# Patient Record
Sex: MALE | Race: WHITE | NOT HISPANIC OR LATINO
[De-identification: names, ages, dates, MRNs, and addresses within clinical notes are randomized per-mention and may not be internally consistent; named-entity substitution may affect disease eponyms.]

---

## 2018-07-10 PROBLEM — Z00.00 ENCOUNTER FOR PREVENTIVE HEALTH EXAMINATION: Status: ACTIVE | Noted: 2018-07-10

## 2018-08-15 ENCOUNTER — APPOINTMENT (OUTPATIENT)
Dept: UROLOGY | Facility: CLINIC | Age: 68
End: 2018-08-15

## 2019-01-10 ENCOUNTER — APPOINTMENT (OUTPATIENT)
Dept: SURGERY | Facility: CLINIC | Age: 69
End: 2019-01-10
Payer: MEDICARE

## 2019-01-10 VITALS — HEIGHT: 72 IN | BODY MASS INDEX: 30.75 KG/M2 | WEIGHT: 227 LBS

## 2019-01-10 PROCEDURE — 99203 OFFICE O/P NEW LOW 30 MIN: CPT

## 2019-01-10 NOTE — CONSULT LETTER
[Dear  ___] : Dear  [unfilled], [Courtesy Letter:] : I had the pleasure of seeing your patient, [unfilled], in my office today. [Please see my note below.] : Please see my note below. [Consult Closing:] : Thank you very much for allowing me to participate in the care of this patient.  If you have any questions, please do not hesitate to contact me. [FreeTextEntry3] : Respectfully,\par \par Abhijeet Nowak M.D., FACS\par

## 2019-01-10 NOTE — ASSESSMENT
[FreeTextEntry1] : Gregory is a pleasant 68-year-old retired gentleman with a past medical history significant for hypertension and hypercholesterolemia along with a past surgical history significant for a left inguinal hernia repair in 1975 in [place] who presents to the office today with an enlarging and now tender bulge in the right groin another hernia.\par \par Physical examination demonstrates a large lemon-sized bulge in the right groin extending into his right upper hemiscrotum which is moderately tender and only partially reducible consistent with an incarcerated right inguinal hernia warranting surgical repair. There is no evidence of incarceration or strangulation, and the patient denies any symptoms of obstruction.  Examination of his left groin demonstrates a well-healed scar with no evidence of hernia recurrence. His umbilical examination is unremarkable. He does have a significant amount of excess abdominal weight and his current BMI is 31.\par \par I explained the pros and cons of surgery, as well as all risks, benefits, indications and alternatives of the procedure and the patient agrees. Gregory was scheduled for the repair of his incarcerated right inguinal hernia with mesh on Wednesday, February 13, 2019 under local with IV sedation at the [facility] with presurgical testing waived.  The patient was encouraged to avoid heavy lifting and strenuous activity in the interim, of course. Of note, Gregory has a cruise scheduled for the end of this month and he was encouraged to avoid carrying any heavy suitcases on that trip.

## 2019-01-10 NOTE — PHYSICAL EXAM
[Normal Breath Sounds] : Normal breath sounds [No Rash or Lesion] : No rash or lesion [Alert] : alert [Calm] : calm [JVD] : no jugular venous distention  [de-identified] : overweight [de-identified] : normal [de-identified] : protuberant abdomen\par  [de-identified] : normal testicles [de-identified] : large incarcerated right inguinal hernia

## 2019-02-13 ENCOUNTER — OUTPATIENT (OUTPATIENT)
Dept: OUTPATIENT SERVICES | Facility: HOSPITAL | Age: 69
LOS: 1 days | Discharge: HOME | End: 2019-02-13

## 2019-02-13 ENCOUNTER — APPOINTMENT (OUTPATIENT)
Dept: SURGERY | Facility: AMBULATORY SURGERY CENTER | Age: 69
End: 2019-02-13
Payer: MEDICARE

## 2019-02-13 VITALS
DIASTOLIC BLOOD PRESSURE: 76 MMHG | TEMPERATURE: 97 F | HEIGHT: 78 IN | OXYGEN SATURATION: 97 % | RESPIRATION RATE: 18 BRPM | HEART RATE: 68 BPM | SYSTOLIC BLOOD PRESSURE: 132 MMHG | WEIGHT: 227.08 LBS

## 2019-02-13 VITALS
OXYGEN SATURATION: 99 % | SYSTOLIC BLOOD PRESSURE: 130 MMHG | DIASTOLIC BLOOD PRESSURE: 71 MMHG | RESPIRATION RATE: 18 BRPM | HEART RATE: 62 BPM

## 2019-02-13 DIAGNOSIS — Z98.890 OTHER SPECIFIED POSTPROCEDURAL STATES: Chronic | ICD-10-CM

## 2019-02-13 PROCEDURE — 49507 PRP I/HERN INIT BLOCK >5 YR: CPT | Mod: RT

## 2019-02-13 RX ORDER — MORPHINE SULFATE 50 MG/1
2 CAPSULE, EXTENDED RELEASE ORAL
Qty: 0 | Refills: 0 | Status: DISCONTINUED | OUTPATIENT
Start: 2019-02-13 | End: 2019-02-13

## 2019-02-13 RX ORDER — ONDANSETRON 8 MG/1
4 TABLET, FILM COATED ORAL ONCE
Qty: 0 | Refills: 0 | Status: DISCONTINUED | OUTPATIENT
Start: 2019-02-13 | End: 2019-02-28

## 2019-02-13 RX ORDER — OXYCODONE AND ACETAMINOPHEN 5; 325 MG/1; MG/1
1 TABLET ORAL ONCE
Qty: 0 | Refills: 0 | Status: DISCONTINUED | OUTPATIENT
Start: 2019-02-13 | End: 2019-02-13

## 2019-02-13 RX ORDER — TRAMADOL HYDROCHLORIDE 50 MG/1
1 TABLET ORAL
Qty: 30 | Refills: 0
Start: 2019-02-13 | End: 2019-02-17

## 2019-02-13 RX ORDER — SODIUM CHLORIDE 9 MG/ML
1000 INJECTION, SOLUTION INTRAVENOUS
Qty: 0 | Refills: 0 | Status: DISCONTINUED | OUTPATIENT
Start: 2019-02-13 | End: 2019-02-28

## 2019-02-13 RX ORDER — HYDROMORPHONE HYDROCHLORIDE 2 MG/ML
0.5 INJECTION INTRAMUSCULAR; INTRAVENOUS; SUBCUTANEOUS
Qty: 0 | Refills: 0 | Status: DISCONTINUED | OUTPATIENT
Start: 2019-02-13 | End: 2019-02-13

## 2019-02-13 RX ADMIN — SODIUM CHLORIDE 100 MILLILITER(S): 9 INJECTION, SOLUTION INTRAVENOUS at 16:14

## 2019-02-13 NOTE — BRIEF OPERATIVE NOTE - PROCEDURE
<<-----Click on this checkbox to enter Procedure Open repair of incarcerated inguinal hernia using mesh in adult  02/13/2019    Active  Abhijeet Nowak

## 2019-02-13 NOTE — CHART NOTE - NSCHARTNOTEFT_GEN_A_CORE
PACU ANESTHESIA ADMISSION NOTE      Procedure: Open repair of incarcerated inguinal hernia using mesh in adult    Post op diagnosis:  Incarcerated right inguinal hernia      ____  Intubated  TV:______       Rate: ______      FiO2: ______    _x___  Patent Airway    _x___  Full return of protective reflexes    _x___  Full recovery from anesthesia / back to baseline status    Vitals:    See anesthesia record      Mental Status:  _x___ Awake   _____ Alert   _____ Drowsy   _____ Sedated    Nausea/Vomiting:  _x___  NO       ______Yes,   See Post - Op Orders         Pain Scale (0-10):  __0___    Treatment: _x___ None    ____ See Post - Op/PCA Orders    Post - Operative Fluids:   __x__ Oral   ____ See Post - Op Orders    Plan: Discharge:   _x___Home       _____Floor     _____Critical Care    _____  Other:_________________    Comments:  No anesthesia issues or complications noted.  Discharge when criteria met.

## 2019-02-17 DIAGNOSIS — K40.30 UNILATERAL INGUINAL HERNIA, WITH OBSTRUCTION, WITHOUT GANGRENE, NOT SPECIFIED AS RECURRENT: ICD-10-CM

## 2019-02-17 DIAGNOSIS — I10 ESSENTIAL (PRIMARY) HYPERTENSION: ICD-10-CM

## 2019-02-26 ENCOUNTER — APPOINTMENT (OUTPATIENT)
Dept: SURGERY | Facility: CLINIC | Age: 69
End: 2019-02-26
Payer: MEDICARE

## 2019-02-26 DIAGNOSIS — E66.09 OTHER OBESITY DUE TO EXCESS CALORIES: ICD-10-CM

## 2019-02-26 DIAGNOSIS — K40.90 UNILATERAL INGUINAL HERNIA, W/OUT OBSTRUCTION OR GANGRENE, NOT SPECIFIED AS RECURRENT: ICD-10-CM

## 2019-02-26 PROCEDURE — 99024 POSTOP FOLLOW-UP VISIT: CPT

## 2019-02-26 NOTE — CONSULT LETTER
[FreeTextEntry1] : Dear Dr. Daria Alfonso, \par \par I had the pleasure of seeing your patient, RENETTA BARDALES, in my office today. Please see my note below. \par \par Thank you very much for allowing me to participate in the care of this patient. If you have any questions, please do not hesitate to contact me. \par \par \par Respectfully,\par \par Abhijeet Nowak M.D., FACS

## 2019-02-26 NOTE — ASSESSMENT
[FreeTextEntry1] : Gregory underwent the repair of his very large incarcerated indirect right inguinal hernia with mesh on February 13, 2019 under local with IV sedation without any problems or complications. His wound is clean, dry and intact. There is no evidence of erythema, seroma formation or infection. He is tolerating a diet and having normal bowel movements. He denies any significant postoperative pain or discomfort at this time.\par \par He was counseled and reassured. He was discharged from the office with no specific followup necessary, but he knows to avoid any heavy lifting or strenuous activity for the next several weeks. We also discussed the importance of calorie restriction and healthy eating with regard to weight loss, hernia recurrence and one's overall health.

## 2021-01-16 NOTE — ASU DISCHARGE PLAN (ADULT/PEDIATRIC). - NOTIFY
Called patient to schedule consult with Dr. Marie, there was no answer.  Left message with my direct line [phone].       Bleeding that does not stop

## 2022-04-06 ENCOUNTER — EMERGENCY (EMERGENCY)
Facility: HOSPITAL | Age: 72
LOS: 0 days | Discharge: HOME | End: 2022-04-06
Attending: STUDENT IN AN ORGANIZED HEALTH CARE EDUCATION/TRAINING PROGRAM | Admitting: STUDENT IN AN ORGANIZED HEALTH CARE EDUCATION/TRAINING PROGRAM
Payer: MEDICARE

## 2022-04-06 VITALS
HEART RATE: 89 BPM | DIASTOLIC BLOOD PRESSURE: 90 MMHG | RESPIRATION RATE: 18 BRPM | OXYGEN SATURATION: 99 % | SYSTOLIC BLOOD PRESSURE: 165 MMHG

## 2022-04-06 VITALS
TEMPERATURE: 97 F | SYSTOLIC BLOOD PRESSURE: 178 MMHG | OXYGEN SATURATION: 99 % | WEIGHT: 229.94 LBS | HEIGHT: 78 IN | DIASTOLIC BLOOD PRESSURE: 101 MMHG | HEART RATE: 87 BPM | RESPIRATION RATE: 17 BRPM

## 2022-04-06 DIAGNOSIS — I10 ESSENTIAL (PRIMARY) HYPERTENSION: ICD-10-CM

## 2022-04-06 DIAGNOSIS — Z98.890 OTHER SPECIFIED POSTPROCEDURAL STATES: Chronic | ICD-10-CM

## 2022-04-06 DIAGNOSIS — R07.9 CHEST PAIN, UNSPECIFIED: ICD-10-CM

## 2022-04-06 DIAGNOSIS — Z86.16 PERSONAL HISTORY OF COVID-19: ICD-10-CM

## 2022-04-06 DIAGNOSIS — U07.1 COVID-19: ICD-10-CM

## 2022-04-06 DIAGNOSIS — E78.5 HYPERLIPIDEMIA, UNSPECIFIED: ICD-10-CM

## 2022-04-06 DIAGNOSIS — E78.00 PURE HYPERCHOLESTEROLEMIA, UNSPECIFIED: ICD-10-CM

## 2022-04-06 DIAGNOSIS — R07.89 OTHER CHEST PAIN: ICD-10-CM

## 2022-04-06 DIAGNOSIS — R91.1 SOLITARY PULMONARY NODULE: ICD-10-CM

## 2022-04-06 DIAGNOSIS — R00.1 BRADYCARDIA, UNSPECIFIED: ICD-10-CM

## 2022-04-06 LAB
ALBUMIN SERPL ELPH-MCNC: 4.7 G/DL — SIGNIFICANT CHANGE UP (ref 3.5–5.2)
ALP SERPL-CCNC: 55 U/L — SIGNIFICANT CHANGE UP (ref 30–115)
ALT FLD-CCNC: 33 U/L — SIGNIFICANT CHANGE UP (ref 0–41)
ANION GAP SERPL CALC-SCNC: 13 MMOL/L — SIGNIFICANT CHANGE UP (ref 7–14)
AST SERPL-CCNC: 31 U/L — SIGNIFICANT CHANGE UP (ref 0–41)
BASOPHILS # BLD AUTO: 0.01 K/UL — SIGNIFICANT CHANGE UP (ref 0–0.2)
BASOPHILS NFR BLD AUTO: 0.3 % — SIGNIFICANT CHANGE UP (ref 0–1)
BILIRUB SERPL-MCNC: 0.8 MG/DL — SIGNIFICANT CHANGE UP (ref 0.2–1.2)
BUN SERPL-MCNC: 20 MG/DL — SIGNIFICANT CHANGE UP (ref 10–20)
CALCIUM SERPL-MCNC: 9.6 MG/DL — SIGNIFICANT CHANGE UP (ref 8.5–10.1)
CHLORIDE SERPL-SCNC: 104 MMOL/L — SIGNIFICANT CHANGE UP (ref 98–110)
CO2 SERPL-SCNC: 22 MMOL/L — SIGNIFICANT CHANGE UP (ref 17–32)
CREAT SERPL-MCNC: 1.2 MG/DL — SIGNIFICANT CHANGE UP (ref 0.7–1.5)
EGFR: 65 ML/MIN/1.73M2 — SIGNIFICANT CHANGE UP
EOSINOPHIL # BLD AUTO: 0.04 K/UL — SIGNIFICANT CHANGE UP (ref 0–0.7)
EOSINOPHIL NFR BLD AUTO: 1.1 % — SIGNIFICANT CHANGE UP (ref 0–8)
GLUCOSE SERPL-MCNC: 105 MG/DL — HIGH (ref 70–99)
HCT VFR BLD CALC: 41 % — LOW (ref 42–52)
HGB BLD-MCNC: 14 G/DL — SIGNIFICANT CHANGE UP (ref 14–18)
IMM GRANULOCYTES NFR BLD AUTO: 0.6 % — HIGH (ref 0.1–0.3)
LYMPHOCYTES # BLD AUTO: 0.73 K/UL — LOW (ref 1.2–3.4)
LYMPHOCYTES # BLD AUTO: 21 % — SIGNIFICANT CHANGE UP (ref 20.5–51.1)
MAGNESIUM SERPL-MCNC: 2 MG/DL — SIGNIFICANT CHANGE UP (ref 1.8–2.4)
MCHC RBC-ENTMCNC: 30.2 PG — SIGNIFICANT CHANGE UP (ref 27–31)
MCHC RBC-ENTMCNC: 34.1 G/DL — SIGNIFICANT CHANGE UP (ref 32–37)
MCV RBC AUTO: 88.4 FL — SIGNIFICANT CHANGE UP (ref 80–94)
MONOCYTES # BLD AUTO: 0.35 K/UL — SIGNIFICANT CHANGE UP (ref 0.1–0.6)
MONOCYTES NFR BLD AUTO: 10.1 % — HIGH (ref 1.7–9.3)
NEUTROPHILS # BLD AUTO: 2.33 K/UL — SIGNIFICANT CHANGE UP (ref 1.4–6.5)
NEUTROPHILS NFR BLD AUTO: 66.9 % — SIGNIFICANT CHANGE UP (ref 42.2–75.2)
NRBC # BLD: 0 /100 WBCS — SIGNIFICANT CHANGE UP (ref 0–0)
NT-PROBNP SERPL-SCNC: 74 PG/ML — SIGNIFICANT CHANGE UP (ref 0–300)
PLATELET # BLD AUTO: 149 K/UL — SIGNIFICANT CHANGE UP (ref 130–400)
POTASSIUM SERPL-MCNC: 4.4 MMOL/L — SIGNIFICANT CHANGE UP (ref 3.5–5)
POTASSIUM SERPL-SCNC: 4.4 MMOL/L — SIGNIFICANT CHANGE UP (ref 3.5–5)
PROT SERPL-MCNC: 6.7 G/DL — SIGNIFICANT CHANGE UP (ref 6–8)
RBC # BLD: 4.64 M/UL — LOW (ref 4.7–6.1)
RBC # FLD: 12.6 % — SIGNIFICANT CHANGE UP (ref 11.5–14.5)
SODIUM SERPL-SCNC: 139 MMOL/L — SIGNIFICANT CHANGE UP (ref 135–146)
TROPONIN T SERPL-MCNC: <0.01 NG/ML — SIGNIFICANT CHANGE UP
TROPONIN T SERPL-MCNC: <0.01 NG/ML — SIGNIFICANT CHANGE UP
WBC # BLD: 3.48 K/UL — LOW (ref 4.8–10.8)
WBC # FLD AUTO: 3.48 K/UL — LOW (ref 4.8–10.8)

## 2022-04-06 PROCEDURE — 99285 EMERGENCY DEPT VISIT HI MDM: CPT

## 2022-04-06 PROCEDURE — 93010 ELECTROCARDIOGRAM REPORT: CPT | Mod: 76

## 2022-04-06 PROCEDURE — 71045 X-RAY EXAM CHEST 1 VIEW: CPT | Mod: 26

## 2022-04-06 RX ORDER — ATORVASTATIN CALCIUM 80 MG/1
1 TABLET, FILM COATED ORAL
Qty: 0 | Refills: 0 | DISCHARGE

## 2022-04-06 RX ORDER — OLMESARTAN MEDOXOMIL 5 MG/1
1 TABLET, FILM COATED ORAL
Qty: 0 | Refills: 0 | DISCHARGE

## 2022-04-06 NOTE — ED PROVIDER NOTE - CLINICAL SUMMARY MEDICAL DECISION MAKING FREE TEXT BOX
71-year-old male past medical history HTN, HLD who presents to the ER for chest pain.  atypical, + RF. EKG, CXR, labs, trop x2 reviewed. On reassessment.  Patient is well-appearing.  Patient was made aware of incidental findings and told to follow-up with their primary care physician. I have fully discussed the medical management and delivery of care with the patient. I have discussed any available labs, imaging and treatment options with the patient. All Questions answered at the bedside and printed copies of all results provided and recommended to review with PCP. Patient confirms understanding and has been given detailed return precautions. Patient instructed to return to the ED should symptoms persist or worsen. Patient has demonstrated capacity and has verbalized understanding. Patient is well appearing upon discharge, ambulatory with a steady gait.

## 2022-04-06 NOTE — ED PROVIDER NOTE - OBJECTIVE STATEMENT
72 yo male, pmh of htn, hld, presents to ed for cp, midsternal, mild, aching, no radiation, tob use, fh of cardiac history. Denies fever, chills, sob, abd pain, nvd. Pt states tested + covid 3 days ago.

## 2022-04-06 NOTE — ED PROVIDER NOTE - CARE PLAN
1 Principal Discharge DX:	Chest pain   Principal Discharge DX:	Chest pain  Secondary Diagnosis:	Lesion of lung

## 2022-04-06 NOTE — ED PROVIDER NOTE - ATTENDING CONTRIBUTION TO CARE
71-year-old male past medical history HTN, HLD who presents to the ER for chest pain.  He describes the pain as tightness, substernal, nonradiating, no aggravating or alleviating factors.  Of note, he tested positive for Covid on .  Denies shortness of breath, lower extremity edema, recent surgery or travel, history of blood clots.  Denies smoking, last stress test was approximately 8 years ago.  He reports that his father  from an MI at the age of 53.    Vitals noted, triage note reviewed    Gen - NAD, Head - NCAT, Pharynx - clear, MMM, Heart - RRR, no m/g/r, Lungs - CTAB, no w/c/r, Abdomen - soft, NT, ND, Skin - No rash, Extremities - FROM, no edema, erythema, ecchymosis, brisk cap refill, Neuro - A&O x3, equal strength and sensation, non-focal exam    a/p:  CP, atypical, + RF  EKG, CXR, labs, trop x2  reassess

## 2022-04-06 NOTE — ED PROVIDER NOTE - PHYSICAL EXAMINATION
Physical Exam    Vital Signs: I have reviewed the initial vital signs.  Constitutional: well-nourished, appears stated age, no acute distress  Eyes: Conjunctiva pink, Sclera clear  Cardiovascular: S1 and S2, regular rate, regular rhythm, well-perfused extremities, radial pulses equal and 2+, pedal pulses 2+ and equal   Respiratory: unlabored respiratory effort, clear to auscultation bilaterally no wheezing, rales and rhonchi  Gastrointestinal: soft, non-tender abdomen, no pulsatile mass, normal bowl sounds  Musculoskeletal: supple neck, no lower extremity edema, no midline tenderness  Integumentary: warm, dry, no rash  Neurologic: awake, alert,  nvi

## 2022-04-06 NOTE — ED PROVIDER NOTE - PROGRESS NOTE DETAILS
JR: Patient reassessed at the bedside.  Discussed results thus far.  EKG and troponin negative x1.  Will recommend 2nd trop @17:30 and cardiology referral

## 2022-04-06 NOTE — ED PROVIDER NOTE - CARE PROVIDER_API CALL
Lucian Menjivar)  Cardiovascular Disease; Internal Medicine  [address] [place] [address]  Phone: [phone]  Fax: [phone]  Follow Up Time: 1-3 Days

## 2022-04-06 NOTE — ED PROVIDER NOTE - NS ED ATTENDING STATEMENT MOD
Does NOT APPEAR VISUALLY SIGNIFICANT. This was a shared visit with the LIDIA. I reviewed and verified the documentation and independently performed the documented:

## 2022-04-06 NOTE — ED ADULT NURSE NOTE - NSICDXPASTMEDICALHX_GEN_ALL_CORE_FT
PAST MEDICAL HISTORY:  High cholesterol     Hypertension      PAST MEDICAL HISTORY:  COVID-19     High cholesterol     Hypertension

## 2022-04-06 NOTE — ED PROVIDER NOTE - PATIENT PORTAL LINK FT
You can access the FollowMyHealth Patient Portal offered by [facility] by registering at the following website: [email or web address]. By joining [employer]’s FollowMyHealth portal, you will also be able to view your health information using other applications (apps) compatible with our system.

## 2022-09-28 NOTE — ASU PATIENT PROFILE, ADULT - PRO INTERPRETER NEED 2
This was a shared visit with the MAURISIO. I reviewed and verified the documentation and independently performed the documented: English

## 2023-05-05 NOTE — ASU PREOP CHECKLIST - TAMPON REMOVED
Forms/Letter Request    Type of form/letter: Received 2 page fax from [facility]  Discharge from Agency  Order #[id number]  Put in PCP in basket    Have you been seen for this request:     Do we have the form/letter: Yes    Who is the form from?     Where did/will the form come from? form was faxed in    When is form/letter needed by:     How would you like the form/letter returned: Please fax to: [phone]       UBALDO Ovalles   
Faxed to: [phone]  Katlyn Barker, TC   
Signed, returned to [facility].    
n/a

## 2024-11-14 ENCOUNTER — INPATIENT (INPATIENT)
Facility: HOSPITAL | Age: 74
LOS: 0 days | Discharge: HOME | DRG: 299 | End: 2024-11-15
Attending: INTERNAL MEDICINE | Admitting: FAMILY MEDICINE
Payer: MEDICARE

## 2024-11-14 VITALS
HEART RATE: 73 BPM | DIASTOLIC BLOOD PRESSURE: 94 MMHG | WEIGHT: 225.09 LBS | HEIGHT: 72 IN | TEMPERATURE: 98 F | SYSTOLIC BLOOD PRESSURE: 149 MMHG | RESPIRATION RATE: 18 BRPM | OXYGEN SATURATION: 99 %

## 2024-11-14 DIAGNOSIS — Z86.16 PERSONAL HISTORY OF COVID-19: ICD-10-CM

## 2024-11-14 DIAGNOSIS — I26.99 OTHER PULMONARY EMBOLISM WITHOUT ACUTE COR PULMONALE: ICD-10-CM

## 2024-11-14 DIAGNOSIS — Z98.890 OTHER SPECIFIED POSTPROCEDURAL STATES: Chronic | ICD-10-CM

## 2024-11-14 DIAGNOSIS — R59.0 LOCALIZED ENLARGED LYMPH NODES: ICD-10-CM

## 2024-11-14 DIAGNOSIS — E78.00 PURE HYPERCHOLESTEROLEMIA, UNSPECIFIED: ICD-10-CM

## 2024-11-14 PROBLEM — U07.1 COVID-19: Chronic | Status: ACTIVE | Noted: 2022-04-06

## 2024-11-14 LAB
ALBUMIN SERPL ELPH-MCNC: 5 G/DL — SIGNIFICANT CHANGE UP (ref 3.5–5.2)
ALP SERPL-CCNC: 59 U/L — SIGNIFICANT CHANGE UP (ref 30–115)
ALT FLD-CCNC: 26 U/L — SIGNIFICANT CHANGE UP (ref 0–41)
ANION GAP SERPL CALC-SCNC: 13 MMOL/L — SIGNIFICANT CHANGE UP (ref 7–14)
APTT BLD: 29.6 SEC — SIGNIFICANT CHANGE UP (ref 27–39.2)
AST SERPL-CCNC: 23 U/L — SIGNIFICANT CHANGE UP (ref 0–41)
BASOPHILS # BLD AUTO: 0.02 K/UL — SIGNIFICANT CHANGE UP (ref 0–0.2)
BASOPHILS NFR BLD AUTO: 0.4 % — SIGNIFICANT CHANGE UP (ref 0–1)
BILIRUB SERPL-MCNC: 1.3 MG/DL — HIGH (ref 0.2–1.2)
BUN SERPL-MCNC: 15 MG/DL — SIGNIFICANT CHANGE UP (ref 10–20)
CALCIUM SERPL-MCNC: 10.1 MG/DL — SIGNIFICANT CHANGE UP (ref 8.4–10.5)
CHLORIDE SERPL-SCNC: 101 MMOL/L — SIGNIFICANT CHANGE UP (ref 98–110)
CO2 SERPL-SCNC: 24 MMOL/L — SIGNIFICANT CHANGE UP (ref 17–32)
CREAT SERPL-MCNC: 1.4 MG/DL — SIGNIFICANT CHANGE UP (ref 0.7–1.5)
EGFR: 53 ML/MIN/1.73M2 — LOW
EOSINOPHIL # BLD AUTO: 0.08 K/UL — SIGNIFICANT CHANGE UP (ref 0–0.7)
EOSINOPHIL NFR BLD AUTO: 1.5 % — SIGNIFICANT CHANGE UP (ref 0–8)
GLUCOSE SERPL-MCNC: 98 MG/DL — SIGNIFICANT CHANGE UP (ref 70–99)
HCT VFR BLD CALC: 43.1 % — SIGNIFICANT CHANGE UP (ref 42–52)
HGB BLD-MCNC: 15 G/DL — SIGNIFICANT CHANGE UP (ref 14–18)
IMM GRANULOCYTES NFR BLD AUTO: 0.4 % — HIGH (ref 0.1–0.3)
INR BLD: 0.95 RATIO — SIGNIFICANT CHANGE UP (ref 0.65–1.3)
LYMPHOCYTES # BLD AUTO: 1.24 K/UL — SIGNIFICANT CHANGE UP (ref 1.2–3.4)
LYMPHOCYTES # BLD AUTO: 23.1 % — SIGNIFICANT CHANGE UP (ref 20.5–51.1)
MCHC RBC-ENTMCNC: 30.8 PG — SIGNIFICANT CHANGE UP (ref 27–31)
MCHC RBC-ENTMCNC: 34.8 G/DL — SIGNIFICANT CHANGE UP (ref 32–37)
MCV RBC AUTO: 88.5 FL — SIGNIFICANT CHANGE UP (ref 80–94)
MONOCYTES # BLD AUTO: 0.44 K/UL — SIGNIFICANT CHANGE UP (ref 0.1–0.6)
MONOCYTES NFR BLD AUTO: 8.2 % — SIGNIFICANT CHANGE UP (ref 1.7–9.3)
NEUTROPHILS # BLD AUTO: 3.56 K/UL — SIGNIFICANT CHANGE UP (ref 1.4–6.5)
NEUTROPHILS NFR BLD AUTO: 66.4 % — SIGNIFICANT CHANGE UP (ref 42.2–75.2)
NRBC # BLD: 0 /100 WBCS — SIGNIFICANT CHANGE UP (ref 0–0)
NT-PROBNP SERPL-SCNC: 93 PG/ML — SIGNIFICANT CHANGE UP (ref 0–300)
PLATELET # BLD AUTO: 179 K/UL — SIGNIFICANT CHANGE UP (ref 130–400)
PMV BLD: 9.6 FL — SIGNIFICANT CHANGE UP (ref 7.4–10.4)
POTASSIUM SERPL-MCNC: 4.1 MMOL/L — SIGNIFICANT CHANGE UP (ref 3.5–5)
POTASSIUM SERPL-SCNC: 4.1 MMOL/L — SIGNIFICANT CHANGE UP (ref 3.5–5)
PROT SERPL-MCNC: 7.3 G/DL — SIGNIFICANT CHANGE UP (ref 6–8)
PROTHROM AB SERPL-ACNC: 11.2 SEC — SIGNIFICANT CHANGE UP (ref 9.95–12.87)
RBC # BLD: 4.87 M/UL — SIGNIFICANT CHANGE UP (ref 4.7–6.1)
RBC # FLD: 12.6 % — SIGNIFICANT CHANGE UP (ref 11.5–14.5)
SODIUM SERPL-SCNC: 138 MMOL/L — SIGNIFICANT CHANGE UP (ref 135–146)
TROPONIN T, HIGH SENSITIVITY RESULT: 12 NG/L — SIGNIFICANT CHANGE UP (ref 6–21)
WBC # BLD: 5.36 K/UL — SIGNIFICANT CHANGE UP (ref 4.8–10.8)
WBC # FLD AUTO: 5.36 K/UL — SIGNIFICANT CHANGE UP (ref 4.8–10.8)

## 2024-11-14 PROCEDURE — 99285 EMERGENCY DEPT VISIT HI MDM: CPT

## 2024-11-14 PROCEDURE — 71275 CT ANGIOGRAPHY CHEST: CPT | Mod: 26,MC

## 2024-11-14 PROCEDURE — 93970 EXTREMITY STUDY: CPT | Mod: 26

## 2024-11-14 PROCEDURE — 85025 COMPLETE CBC W/AUTO DIFF WBC: CPT

## 2024-11-14 PROCEDURE — 80053 COMPREHEN METABOLIC PANEL: CPT

## 2024-11-14 PROCEDURE — 99223 1ST HOSP IP/OBS HIGH 75: CPT

## 2024-11-14 PROCEDURE — 80061 LIPID PANEL: CPT

## 2024-11-14 RX ORDER — AMLODIPINE BESYLATE 10 MG
5 TABLET ORAL DAILY
Refills: 0 | Status: DISCONTINUED | OUTPATIENT
Start: 2024-11-14 | End: 2024-11-15

## 2024-11-14 RX ORDER — MELATONIN 5 MG
3 TABLET ORAL AT BEDTIME
Refills: 0 | Status: DISCONTINUED | OUTPATIENT
Start: 2024-11-14 | End: 2024-11-15

## 2024-11-14 RX ORDER — TAMSULOSIN HCL 0.4 MG
1 CAPSULE ORAL
Refills: 0 | DISCHARGE

## 2024-11-14 RX ORDER — MAGNESIUM, ALUMINUM HYDROXIDE 200-200 MG
30 TABLET,CHEWABLE ORAL EVERY 4 HOURS
Refills: 0 | Status: DISCONTINUED | OUTPATIENT
Start: 2024-11-14 | End: 2024-11-15

## 2024-11-14 RX ORDER — ONDANSETRON HYDROCHLORIDE 2 MG/ML
4 INJECTION, SOLUTION INTRAMUSCULAR; INTRAVENOUS EVERY 8 HOURS
Refills: 0 | Status: DISCONTINUED | OUTPATIENT
Start: 2024-11-14 | End: 2024-11-15

## 2024-11-14 RX ORDER — AMLODIPINE BESYLATE 10 MG
1 TABLET ORAL
Refills: 0 | DISCHARGE

## 2024-11-14 RX ORDER — TAMSULOSIN HCL 0.4 MG
0.4 CAPSULE ORAL AT BEDTIME
Refills: 0 | Status: DISCONTINUED | OUTPATIENT
Start: 2024-11-14 | End: 2024-11-15

## 2024-11-14 RX ORDER — APIXABAN 5 MG/1
10 TABLET, FILM COATED ORAL ONCE
Refills: 0 | Status: COMPLETED | OUTPATIENT
Start: 2024-11-14 | End: 2024-11-14

## 2024-11-14 RX ORDER — ENOXAPARIN SODIUM 40MG/0.4ML
100 SYRINGE (ML) SUBCUTANEOUS EVERY 12 HOURS
Refills: 0 | Status: DISCONTINUED | OUTPATIENT
Start: 2024-11-15 | End: 2024-11-15

## 2024-11-14 RX ORDER — ACETAMINOPHEN 500 MG
650 TABLET ORAL EVERY 6 HOURS
Refills: 0 | Status: DISCONTINUED | OUTPATIENT
Start: 2024-11-14 | End: 2024-11-15

## 2024-11-14 RX ADMIN — Medication 0.4 MILLIGRAM(S): at 22:30

## 2024-11-14 RX ADMIN — APIXABAN 10 MILLIGRAM(S): 5 TABLET, FILM COATED ORAL at 20:32

## 2024-11-14 RX ADMIN — Medication 20 MILLIGRAM(S): at 22:31

## 2024-11-14 NOTE — H&P ADULT - HISTORY OF PRESENT ILLNESS
75 y/o Male  c/o left leg swelling for past 2 weeks, seen by PMD today , sent to ED for sono to R/o DVT,  no h/o leg trauma, no h/o DVT,   admits to some LYN past 2 weeks 75 y/o Male  PmHx HTN, HLD, BPH, vascular surg of Left Lower Ext presents with c/o left leg swelling for past 2 weeks.  Pt states he was seen at PMD today, sent to ED from office for further work-up.  Pt denies leg trauma,   Presently, NAD, accompanied by spouse.      Home meds  provided by pt  however spouse will bring med bottles in am.   75 y/o Male  PmHx HTN, HLD, BPH, vascular surg of Left Lower Ext presents with c/o left leg swelling for past 2 weeks.  Pt states he was seen at PMD today, sent to ED from office for further work-up.  Pt denies leg trauma,   Presently, NAD, accompanied by spouse.      reports some sob at time.  denies any CP, palpitation, abdm pain, N/V/D, headache, dizziness, blurry vision, numbness or weakness    Home meds  provided by pt  however spouse will bring med bottles in am.

## 2024-11-14 NOTE — H&P ADULT - ASSESSMENT
73 y/o Male  PmHx HTN, HLD, BPH, vascular surg of Left Lower Ext presents with c/o left leg swelling for past 2 weeks.  Pt states he was seen at PMD today, sent to ED from office for further work-up.  Pt denies leg trauma,   Presently, NAD, accompanied by spouse.        # Pulmonary Embolism  - Lovenox 100mg sq Q12  - Pulm consult    # Lung Nodule  - Pulm consult  - Hem/Onc consult    # HTN  - VS  - c/w home med    # HLD  - c/w home med    # BPH  - c/w home med     73 y/o Male  PmHx HTN, HLD, BPH, vascular surg of Left Lower Ext presents with c/o left leg swelling for past 2 weeks.  Pt states he was seen at PMD today, sent to ED from office for further work-up.  Pt denies leg trauma,   Presently, NAD, accompanied by spouse.        # Pulmonary Embolism  #DVT pending US reading  #Lung nodule  -Chest CTA shows  Bilateral pulmonary emboli as described. No CT evidence of right  heart strain.  Irregular 1.5 cm right middle lobe pulmonary nodular opacity.  Adjacent surrounding satellite nodularity. This is of indeterminate   etiology with neoplasm on the differential. Recommend further oncologic workup and consider PET/CT evaluation.  Additional numerous scattered pulmonary nodules, indeterminate.   Indeterminate right seventh rib sclerotic lesion, cannot exclude neoplastic etiology/metastasis..  Calcified mediastinal lymphadenopathy.  - Lovenox 100mg sq Q12  -hematology/oncology, and  Pulm consult  -Patient is aware of the lung nodule  -Hx of smoking, quit 30 yrs ago.  negative colonoscopy last year           # HTN  - VS  - c/w home med    # HLD  - c/w home med    # BPH  - c/w home med    #Progress Note Handoff  Pending (specify):  as above   Family discussion:  plan of care was discussed with patient and family in details.  all questions were answered.  seems to understand, and in agreement  Disposition: home

## 2024-11-14 NOTE — ED PROVIDER NOTE - NSICDXPASTMEDICALHX_GEN_ALL_CORE_FT
Vaccine Information Statement(s) for was given today. This has been reviewed, questions answered, and verbal consent given by Patient for injection(s) and administration of Hepatitis A.    Clinic supplied vaccine.    Patient tolerated without incident. See immunization grid for documentation.    Bilateral ear irrigation done. Pt tolerated well. Large amount of cerumen flushed from left ear.    PAST MEDICAL HISTORY:  COVID-19     High cholesterol     Hypertension

## 2024-11-14 NOTE — ED PROVIDER NOTE - CLINICAL SUMMARY MEDICAL DECISION MAKING FREE TEXT BOX
74-year-old male past medical history of hypertension, dyslipidemia, BPH, prior vein removal to left lower extremity who presents to the emergency department for increasing left leg pain and swelling over the past 2 weeks.  Patient sent by primary care doctor for evaluation.  Additional history obtained from patient's wife.  Patient had no falls or direct trauma but reports having pain and swelling to his left calf.  Patient reports intermittently having chest pain and does report having shortness of breath at times.  No fever or signs of infectious etiology.    On exam, vital signs reviewed.  Patient is nontoxic-appearing.  Patient has normal heart sounds and clear lungs bilaterally.  Patient has normal neuro exam.  Patient has pitting edema and swelling with calf tenderness on the left with left calf significantly larger than the right.  DVT study performed and shows extensive DVT to left leg.  IV placed and labs sent and patient had CTA of chest to evaluate for PE given shortness of breath and intermittent chest pain.  Patient has normal kidney function and normal white blood cell count and CTA of chest shows bilateral pulmonary emboli with no right heart strain.  Patient has normal troponin and BNP level.  Patient started on Eliquis at PE dosage and plan made for admission for formal echo and further workup and management for large DVT to leg and bilateral PEs.  Patient's PESI score calculated and dispo decision.  Patient and wife spoken to in detail about results and plan of care, need for admission, need for further workup and testing.  Patient has no contraindications to anticoagulation.

## 2024-11-14 NOTE — ED ADULT NURSE NOTE - NSFALLUNIVINTERV_ED_ALL_ED
Bed/Stretcher in lowest position, wheels locked, appropriate side rails in place/Call bell, personal items and telephone in reach/Instruct patient to call for assistance before getting out of bed/chair/stretcher/Non-slip footwear applied when patient is off stretcher/[place] to call system/Physically safe environment - no spills, clutter or unnecessary equipment/Purposeful proactive rounding/Room/bathroom lighting operational, light cord in reach

## 2024-11-14 NOTE — ED PROVIDER NOTE - OBJECTIVE STATEMENT
Patient c/o left leg swelling for past 2 weeks, seen by PMD today , sent to ED for sono to R/o DVT,  no h/o leg trauma, no h/o DVT,   admits to some LYN past 2 weeks

## 2024-11-14 NOTE — H&P ADULT - NSHPPHYSICALEXAM_GEN_ALL_CORE
Vital Signs Last 24 Hrs  T(C): 36.5 (14 Nov 2024 21:39), Max: 36.5 (14 Nov 2024 21:39)  T(F): 97.7 (14 Nov 2024 21:39), Max: 97.7 (14 Nov 2024 21:39)  HR: 63 (14 Nov 2024 21:39) (63 - 73)  BP: 131/84 (14 Nov 2024 21:39) (131/84 - 149/94)  BP(mean): --  RR: 18 (14 Nov 2024 21:39) (18 - 18)  SpO2: 99% (14 Nov 2024 21:39) (99% - 99%)    Parameters below as of 14 Nov 2024 21:39  Patient On (Oxygen Delivery Method): room air

## 2024-11-14 NOTE — ED PROVIDER NOTE - ATTENDING APP SHARED VISIT CONTRIBUTION OF CARE
I personally evaluated patient. I agree with the findings and plan with all documentation on chart except as documented  in my note.    74-year-old male past medical history of hypertension, dyslipidemia, BPH, prior vein removal to left lower extremity who presents to the emergency department for increasing left leg pain and swelling over the past 2 weeks.  Patient sent by primary care doctor for evaluation.  Additional history obtained from patient's wife.  Patient had no falls or direct trauma but reports having pain and swelling to his left calf.  Patient reports intermittently having chest pain and does report having shortness of breath at times.  No fever or signs of infectious etiology.    On exam, vital signs reviewed.  Patient is nontoxic-appearing.  Patient has normal heart sounds and clear lungs bilaterally.  Patient has normal neuro exam.  Patient has pitting edema and swelling with calf tenderness on the left with left calf significantly larger than the right.  DVT study performed and shows extensive DVT to left leg.  IV placed and labs sent and patient had CTA of chest to evaluate for PE given shortness of breath and intermittent chest pain.  Patient has normal kidney function and normal white blood cell count and CTA of chest shows bilateral pulmonary emboli with no right heart strain.  Patient has normal troponin and BNP level.  Patient started on Eliquis at PE dosage and plan made for admission for formal echo and further workup and management for large DVT to leg and bilateral PEs.  Patient's PESI score calculated and dispo decision.  Patient and wife spoken to in detail about results and plan of care, need for admission, need for further workup and testing.  Patient has no contraindications to anticoagulation.

## 2024-11-14 NOTE — ED PROVIDER NOTE - DIFFERENTIAL DIAGNOSIS
The differential diagnosis for patients clinical presentation includes but is not limited to:  DVT, Bakers cust, strain, ACS, MI, aortic dissection, pneumothorax, pneumonia, MSK, pulmonary embolism Differential Diagnosis

## 2024-11-15 ENCOUNTER — TRANSCRIPTION ENCOUNTER (OUTPATIENT)
Age: 74
End: 2024-11-15

## 2024-11-15 VITALS
SYSTOLIC BLOOD PRESSURE: 138 MMHG | DIASTOLIC BLOOD PRESSURE: 90 MMHG | HEART RATE: 67 BPM | RESPIRATION RATE: 16 BRPM | OXYGEN SATURATION: 100 % | TEMPERATURE: 98 F

## 2024-11-15 LAB
ALBUMIN SERPL ELPH-MCNC: 4.9 G/DL — SIGNIFICANT CHANGE UP (ref 3.5–5.2)
ALP SERPL-CCNC: 58 U/L — SIGNIFICANT CHANGE UP (ref 30–115)
ALT FLD-CCNC: 25 U/L — SIGNIFICANT CHANGE UP (ref 0–41)
ANION GAP SERPL CALC-SCNC: 10 MMOL/L — SIGNIFICANT CHANGE UP (ref 7–14)
AST SERPL-CCNC: 24 U/L — SIGNIFICANT CHANGE UP (ref 0–41)
BASOPHILS # BLD AUTO: 0.03 K/UL — SIGNIFICANT CHANGE UP (ref 0–0.2)
BASOPHILS NFR BLD AUTO: 0.6 % — SIGNIFICANT CHANGE UP (ref 0–1)
BILIRUB SERPL-MCNC: 1.6 MG/DL — HIGH (ref 0.2–1.2)
BUN SERPL-MCNC: 16 MG/DL — SIGNIFICANT CHANGE UP (ref 10–20)
CALCIUM SERPL-MCNC: 9.8 MG/DL — SIGNIFICANT CHANGE UP (ref 8.4–10.5)
CHLORIDE SERPL-SCNC: 105 MMOL/L — SIGNIFICANT CHANGE UP (ref 98–110)
CHOLEST SERPL-MCNC: 196 MG/DL — SIGNIFICANT CHANGE UP
CO2 SERPL-SCNC: 22 MMOL/L — SIGNIFICANT CHANGE UP (ref 17–32)
CREAT SERPL-MCNC: 1.3 MG/DL — SIGNIFICANT CHANGE UP (ref 0.7–1.5)
EGFR: 58 ML/MIN/1.73M2 — LOW
EOSINOPHIL # BLD AUTO: 0.09 K/UL — SIGNIFICANT CHANGE UP (ref 0–0.7)
EOSINOPHIL NFR BLD AUTO: 1.9 % — SIGNIFICANT CHANGE UP (ref 0–8)
GLUCOSE SERPL-MCNC: 135 MG/DL — HIGH (ref 70–99)
HCT VFR BLD CALC: 41.1 % — LOW (ref 42–52)
HDLC SERPL-MCNC: 77 MG/DL — SIGNIFICANT CHANGE UP
HGB BLD-MCNC: 14.5 G/DL — SIGNIFICANT CHANGE UP (ref 14–18)
IMM GRANULOCYTES NFR BLD AUTO: 0.4 % — HIGH (ref 0.1–0.3)
LIPID PNL WITH DIRECT LDL SERPL: 89 MG/DL — SIGNIFICANT CHANGE UP
LYMPHOCYTES # BLD AUTO: 0.93 K/UL — LOW (ref 1.2–3.4)
LYMPHOCYTES # BLD AUTO: 19.7 % — LOW (ref 20.5–51.1)
MCHC RBC-ENTMCNC: 30.3 PG — SIGNIFICANT CHANGE UP (ref 27–31)
MCHC RBC-ENTMCNC: 35.3 G/DL — SIGNIFICANT CHANGE UP (ref 32–37)
MCV RBC AUTO: 85.8 FL — SIGNIFICANT CHANGE UP (ref 80–94)
MONOCYTES # BLD AUTO: 0.32 K/UL — SIGNIFICANT CHANGE UP (ref 0.1–0.6)
MONOCYTES NFR BLD AUTO: 6.8 % — SIGNIFICANT CHANGE UP (ref 1.7–9.3)
NEUTROPHILS # BLD AUTO: 3.32 K/UL — SIGNIFICANT CHANGE UP (ref 1.4–6.5)
NEUTROPHILS NFR BLD AUTO: 70.6 % — SIGNIFICANT CHANGE UP (ref 42.2–75.2)
NON HDL CHOLESTEROL: 119 MG/DL — SIGNIFICANT CHANGE UP
NRBC # BLD: 0 /100 WBCS — SIGNIFICANT CHANGE UP (ref 0–0)
PLATELET # BLD AUTO: 170 K/UL — SIGNIFICANT CHANGE UP (ref 130–400)
PMV BLD: 9.6 FL — SIGNIFICANT CHANGE UP (ref 7.4–10.4)
POTASSIUM SERPL-MCNC: 4.1 MMOL/L — SIGNIFICANT CHANGE UP (ref 3.5–5)
POTASSIUM SERPL-SCNC: 4.1 MMOL/L — SIGNIFICANT CHANGE UP (ref 3.5–5)
PROT SERPL-MCNC: 6.8 G/DL — SIGNIFICANT CHANGE UP (ref 6–8)
RBC # BLD: 4.79 M/UL — SIGNIFICANT CHANGE UP (ref 4.7–6.1)
RBC # FLD: 12.6 % — SIGNIFICANT CHANGE UP (ref 11.5–14.5)
SODIUM SERPL-SCNC: 137 MMOL/L — SIGNIFICANT CHANGE UP (ref 135–146)
TRIGL SERPL-MCNC: 151 MG/DL — HIGH
WBC # BLD: 4.71 K/UL — LOW (ref 4.8–10.8)
WBC # FLD AUTO: 4.71 K/UL — LOW (ref 4.8–10.8)

## 2024-11-15 PROCEDURE — 99223 1ST HOSP IP/OBS HIGH 75: CPT

## 2024-11-15 PROCEDURE — 99222 1ST HOSP IP/OBS MODERATE 55: CPT

## 2024-11-15 PROCEDURE — 99239 HOSP IP/OBS DSCHRG MGMT >30: CPT

## 2024-11-15 RX ORDER — APIXABAN 5 MG/1
1 TABLET, FILM COATED ORAL
Qty: 72 | Refills: 0
Start: 2024-11-15 | End: 2024-12-14

## 2024-11-15 RX ADMIN — Medication 100 MILLIGRAM(S): at 08:26

## 2024-11-15 RX ADMIN — Medication 5 MILLIGRAM(S): at 05:44

## 2024-11-15 NOTE — DISCHARGE NOTE NURSING/CASE MANAGEMENT/SOCIAL WORK - PATIENT PORTAL LINK FT
You can access the FollowMyHealth Patient Portal offered by [facility] by registering at the following website: [email or web address]. By joining [employer]’s FollowMyHealth portal, you will also be able to view your health information using other applications (apps) compatible with our system.

## 2024-11-15 NOTE — CONSULT NOTE ADULT - SUBJECTIVE AND OBJECTIVE BOX
Patient is a 73yo male w/ pmhx of HTN, HLD, BPH, some cosmetic procedure for veins on LLE - varicose veins?? - 32years ago presenting w/ LLE swelling. Patient repots has been having intermittent LLE swelling for the past few months, usually resolves on its own w/in a few days w/ LE elevation however this time the swelling was progressively worsening prompting visit to the ED. Denies any leg pain, denies fever, chills, n/v, chest pain, SOB, palpitations, abdominal pain, changes in BMs, urinary sxs.       PAST MEDICAL & SURGICAL HISTORY:  High cholesterol  Hypertension  COVID-19  H/O left inguinal hernia repair    MEDICATIONS  (STANDING):  amLODIPine   Tablet 5 milliGRAM(s) Oral daily  atorvastatin 20 milliGRAM(s) Oral at bedtime  enoxaparin Injectable 100 milliGRAM(s) SubCutaneous every 12 hours  tamsulosin 0.4 milliGRAM(s) Oral at bedtime    MEDICATIONS  (PRN):  acetaminophen     Tablet .. 650 milliGRAM(s) Oral every 6 hours PRN Temp greater or equal to 38C (100.4F), Mild Pain (1 - 3)  aluminum hydroxide/magnesium hydroxide/simethicone Suspension 30 milliLiter(s) Oral every 4 hours PRN Dyspepsia  melatonin 3 milliGRAM(s) Oral at bedtime PRN Insomnia  ondansetron Injectable 4 milliGRAM(s) IV Push every 8 hours PRN Nausea and/or Vomiting      Allergies  No Known Allergies  Intolerances    Physical Exam:  General: NAD, resting comfortably  Pulmonary: CTA B/L  Cardiovascular: S1, S2  Extremities:  LLE 2+ pitting edema, diminished pedal and tibial pulses, 2+ popliteal pulse, normal capillary refill, motor/sensory intact. RLE w/out swelling, 2+ distal pulses  Neuro: A/O x 3    Vital Signs Last 24 Hrs  T(C): 36.9 (15 Nov 2024 05:24), Max: 36.9 (15 Nov 2024 05:24)  T(F): 98.5 (15 Nov 2024 05:24), Max: 98.5 (15 Nov 2024 05:24)  HR: 61 (15 Nov 2024 05:24) (61 - 73)  BP: 136/76 (15 Nov 2024 05:24) (131/84 - 153/91)  RR: 18 (15 Nov 2024 05:24) (18 - 18)  SpO2: 94% (15 Nov 2024 05:24) (94% - 99%)    Parameters below as of 15 Nov 2024 05:24  Patient On (Oxygen Delivery Method): room air        LABS:                        14.5   4.71  )-----------( 170      ( 15 Nov 2024 09:31 )             41.1     11-15    137  |  105  |  16  ----------------------------<  135[H]  4.1   |  22  |  1.3    Ca    9.8      15 Nov 2024 09:31    TPro  6.8  /  Alb  4.9  /  TBili  1.6[H]  /  DBili  x   /  AST  24  /  ALT  25  /  AlkPhos  58  11-15    PT/INR - ( 14 Nov 2024 17:35 )   PT: 11.20 sec;   INR: 0.95 ratio         PTT - ( 14 Nov 2024 17:35 )  PTT:29.6 sec  Urinalysis Basic - ( 15 Nov 2024 09:31 )    Color: x / Appearance: x / SG: x / pH: x  Gluc: 135 mg/dL / Ketone: x  / Bili: x / Urobili: x   Blood: x / Protein: x / Nitrite: x   Leuk Esterase: x / RBC: x / WBC x   Sq Epi: x / Non Sq Epi: x / Bacteria: x      CAPILLARY BLOOD GLUCOSE        LIVER FUNCTIONS - ( 15 Nov 2024 09:31 )  Alb: 4.9 g/dL / Pro: 6.8 g/dL / ALK PHOS: 58 U/L / ALT: 25 U/L / AST: 24 U/L / GGT: x             Cultures:      RADIOLOGY & ADDITIONAL STUDIES:  < from: CT Angio Chest PE Protocol w/ IV Cont (11.14.24 @ 19:13) >  IMPRESSION:  1.  Bilateral pulmonary emboli as described. No CT evidence of right   heart strain.  2.  Irregular 1.5 cm right middle lobe pulmonary nodular opacity.   Adjacent surrounding satellite nodularity. This is of indeterminate   etiology with neoplasm on the differential. Recommend further oncologic   workup and consider PET/CT evaluation.  3.  Additional numerous scattered pulmonary nodules, indeterminate.  4.  Indeterminate right seventh rib sclerotic lesion, cannot exclude   neoplastic etiology/metastasis..  5.  Calcified mediastinal lymphadenopathy.    Preliminary findings discussed with RED Tabares on 11/14/24 at 8:18 PM.    --- End of Report ---      < from: [facility] Duplex Lower Ext Vein Scan, Bilat (11.14.24 @ 17:15) >  FINDINGS:    RIGHT:  Normal compressibility of the RIGHT common femoral, femoral and popliteal   veins.  Doppler examination shows normal spontaneous and phasic flow.  No RIGHT calf vein thrombosis is detected.    LEFT:  Normal compressibility of the LEFT common femoral veins.  The left distal femoral vein, popliteal vein, posterior tibial vein,   peroneal vein all positivefor acute DVT    IMPRESSION:  No evidence of deep venous thrombosis in right lower extremity  There is left distal femoral vein, popliteal vein, posterior tibial vein,   and peroneal vein DVTs  --- End of Report ---

## 2024-11-15 NOTE — DISCHARGE NOTE PROVIDER - CARE PROVIDER_API CALL
Daria Alfonso  Internal Medicine  [address] [place] [address]  Phone: [phone]  Fax: [phone]  Follow Up Time:     Taran Bar  Critical Care Medicine  [address] [place] [id number]  Phone: [phone]  Fax: [phone]  Follow Up Time:

## 2024-11-15 NOTE — DISCHARGE NOTE PROVIDER - NSDCFUSCHEDAPPT_GEN_ALL_CORE_FT
Taran Bar  [facility]  PULMMED [address] [place] [address] Daniel  Scheduled Appointment: 11/21/2024

## 2024-11-15 NOTE — DISCHARGE NOTE NURSING/CASE MANAGEMENT/SOCIAL WORK - FINANCIAL ASSISTANCE
[facility] provides services at a reduced cost to those who are determined to be eligible through [facility]’s financial assistance program. Information regarding [facility]’s financial assistance program can be found by going to [email or web address] or by calling [phone].

## 2024-11-15 NOTE — CONSULT NOTE ADULT - ASSESSMENT
Patient is a 73yo male w/ pmhx of HTN, HLD, BPH, some cosmetic procedure for veins on LLE - varicose veins?? - 32years ago presenting w/ worsening  LLE swelling found to have b/l PEs and multiple left distal DVTs.     #Left Distal DVT  #Pulmonary embolism  - already started on Lovenox, can dc on Eliquis   - LE elevation, ace wrap   - Outpatient FU w. Dr. West when discharged   - Will sign off, Call back prn [phone]      Above discussed w/ vascular fellow Nadir Barber

## 2024-11-15 NOTE — DISCHARGE NOTE PROVIDER - NSDCCPCAREPLAN_GEN_ALL_CORE_FT
PRINCIPAL DISCHARGE DIAGNOSIS  Diagnosis: Pulmonary embolism  Assessment and Plan of Treatment: Take Eliquis as prescribed and follow with your primary care doctor within 1-2 weeks after discharge.      SECONDARY DISCHARGE DIAGNOSES  Diagnosis: DVT, lower extremity  Assessment and Plan of Treatment: Your Eliquis should prevent more clot from forming.  Take Eliquis as prescribed and follow with your primary care doctor.    Diagnosis: Lung nodule  Assessment and Plan of Treatment: Follow with pulmonologist and primary care doctor.  Your doctors can arrange follow up scans to monitor the size of the nodule.

## 2024-11-15 NOTE — DISCHARGE NOTE PROVIDER - NSDCMRMEDTOKEN_GEN_ALL_CORE_FT
Eliquis 5 mg oral tablet: 1 tab(s) orally 2 times a day Take 10 mg by mouth twice a day for 7 days then  take 5 mg by mouth twice a day  Flomax 0.4 mg oral capsule: 1 cap(s) orally once a day  Lipitor 20 mg oral tablet: 1 tab(s) orally once a day  Norvasc 5 mg oral tablet: 1 tab(s) orally once a day

## 2024-11-15 NOTE — DISCHARGE NOTE PROVIDER - NSDCFUADDINST_GEN_ALL_CORE_FT
Do not fly on a plane for at least 4 weeks.  You will need to be seen by your primary care doctor before you fly again.

## 2024-11-15 NOTE — DISCHARGE NOTE PROVIDER - HOSPITAL COURSE
73 y/o Male  PmHx HTN, HLD, BPH, vascular surg of Left Lower Ext presents with c/o left leg swelling for past 2 weeks.  Pt states he was seen at PMD today, sent to ED from office for further work-up.  Pt denies leg trauma,   Presently, NAD, accompanied by spouse.        # Pulmonary Embolism  #LLE DVT  #Lung nodule  -Chest CTA shows  Bilateral pulmonary emboli as described. No CT evidence of right  heart strain.  Irregular 1.5 cm right middle lobe pulmonary nodular opacity.  Adjacent surrounding satellite nodularity. This is of indeterminate   etiology with neoplasm on the differential. Recommend further oncologic workup and consider PET/CT evaluation.  Additional numerous scattered pulmonary nodules, indeterminate.   Indeterminate right seventh rib sclerotic lesion, cannot exclude neoplastic etiology/metastasis..  Calcified mediastinal lymphadenopathy.  - DC on Eliquis  -Patient is aware of the lung nodule  -Hx of smoking, quit 30 yrs ago.  negative colonoscopy last year  -Will follow as outpatient    # HTN  - VS  - c/w home med    # HLD  - c/w home med    # BPH  - c/w home med

## 2024-11-15 NOTE — CONSULT NOTE ADULT - SUBJECTIVE AND OBJECTIVE BOX
Patient is a 74y old  Male who presents with a chief complaint of SOB (14 Nov 2024 21:22)      HPI:  75 y/o Male  PmHx HTN, HLD, BPH, vascular surg of Left Lower Ext presents with c/o left leg swelling for past 2 weeks.  Pt states he was seen at PMD today, sent to ED from office for further work-up.  Pt denies leg trauma,   Presently, NAD, accompanied by spouse.      reports some sob at time.  denies any CP, palpitation, abdm pain, N/V/D, headache, dizziness, blurry vision, numbness or weakness    Home meds  provided by pt  however spouse will bring med bottles in am.   (14 Nov 2024 21:22)      PAST MEDICAL & SURGICAL HISTORY:  High cholesterol      Hypertension      COVID-19      H/O left inguinal hernia repair          FAMILY HISTORY:    Family history: No family cardiovascular system   Occupation:  Alochol: Denied  Smoking: Denied  Drug Use: Denied  Marital Status:           Allergies    No Known Allergies    Intolerances        Home Medications:  Flomax 0.4 mg oral capsule: 1 cap(s) orally once a day (14 Nov 2024 21:51)  Lipitor 20 mg oral tablet: 1 tab(s) orally once a day (06 Apr 2022 13:14)  Norvasc 5 mg oral tablet: 1 tab(s) orally once a day (14 Nov 2024 21:51)      ROS: as in HPI; All other systems reviewed are negative        PHYSICAL EXAM:  Vital Signs Last 24 Hrs  T(C): 36.9 (15 Nov 2024 05:24), Max: 36.9 (15 Nov 2024 05:24)  T(F): 98.5 (15 Nov 2024 05:24), Max: 98.5 (15 Nov 2024 05:24)  HR: 61 (15 Nov 2024 05:24) (61 - 73)  BP: 136/76 (15 Nov 2024 05:24) (131/84 - 153/91)  BP(mean): --  RR: 18 (15 Nov 2024 05:24) (18 - 18)  SpO2: 94% (15 Nov 2024 05:24) (94% - 99%)    Parameters below as of 15 Nov 2024 05:24  Patient On (Oxygen Delivery Method): room air          GENERAL: NAD, well-groomed, well-developed  HEAD:  Atraumatic, Normocephalic  EYES: EOMI, PERRLA, conjunctiva and sclera clear  ENMT: No tonsillar erythema, exudates, or enlargement; Moist mucous membranes, Good dentition, No lesions  NECK: Supple, No JVD, Normal thyroid  NERVOUS SYSTEM:  Alert & Oriented X3, Good concentration; Motor Strength 5/5 B/L upper and lower extremities; DTRs 2+ intact and symmetric  CHEST/LUNG: Clear to percussion bilaterally; No rales, rhonchi, wheezing, or rubs  HEART: Regular rate and rhythm; No murmurs, rubs, or gallops  ABDOMEN: Soft, Nontender, Nondistended; Bowel sounds present  EXTREMITIES:  2+  edema left leg       HOSPITAL MEDICATIONS:  MEDICATIONS  (STANDING):  amLODIPine   Tablet 5 milliGRAM(s) Oral daily  atorvastatin 20 milliGRAM(s) Oral at bedtime  enoxaparin Injectable 100 milliGRAM(s) SubCutaneous every 12 hours  tamsulosin 0.4 milliGRAM(s) Oral at bedtime    MEDICATIONS  (PRN):  acetaminophen     Tablet .. 650 milliGRAM(s) Oral every 6 hours PRN Temp greater or equal to 38C (100.4F), Mild Pain (1 - 3)  aluminum hydroxide/magnesium hydroxide/simethicone Suspension 30 milliLiter(s) Oral every 4 hours PRN Dyspepsia  melatonin 3 milliGRAM(s) Oral at bedtime PRN Insomnia  ondansetron Injectable 4 milliGRAM(s) IV Push every 8 hours PRN Nausea and/or Vomiting      LABS:                        14.5   4.71  )-----------( 170      ( 15 Nov 2024 09:31 )             41.1     11-14    138  |  101  |  15  ----------------------------<  98  4.1   |  24  |  1.4    Ca    10.1      14 Nov 2024 17:35    TPro  7.3  /  Alb  5.0  /  TBili  1.3[H]  /  DBili  x   /  AST  23  /  ALT  26  /  AlkPhos  59  11-14    PT/INR - ( 14 Nov 2024 17:35 )   PT: 11.20 sec;   INR: 0.95 ratio         PTT - ( 14 Nov 2024 17:35 )  PTT:29.6 sec  Urinalysis Basic - ( 14 Nov 2024 17:35 )    Color: x / Appearance: x / SG: x / pH: x  Gluc: 98 mg/dL / Ketone: x  / Bili: x / Urobili: x   Blood: x / Protein: x / Nitrite: x   Leuk Esterase: x / RBC: x / WBC x   Sq Epi: x / Non Sq Epi: x / Bacteria: x                RADIOLOGY:  [ ] Reviewed and interpreted by me    ECHO:    Point of Care Ultrasound Findings;    PFT:

## 2024-11-15 NOTE — CONSULT NOTE ADULT - ASSESSMENT
73y/o male w/ hx of HTN, HLD, BPH, varicose veins, hx of ?BCC since 1997. Pt c/o leg swelling x 2 weeks admitted for DVT and PE. Chest CT also shows small lung nodules. Heme/Onc consulted for further evaluation.    #pulmonary embolism unprovoked   #DVT   #lung nodules  --Chest CT w/contrast 11/14/2024: 1.  Bilateral pulmonary emboli as described. No CT evidence of right heart strain.  2.  Irregular 1.5 cm right middle lobe pulmonary nodular opacity.   Adjacent surrounding satellite nodularity. This is of indeterminate   etiology with neoplasm on the differential. Recommend further oncologic   workup and consider PET/CT evaluation.  3.  Additional numerous scattered pulmonary nodules, indeterminate.  4.  Indeterminate right seventh rib sclerotic lesion, cannot exclude   neoplastic etiology/metastasis..  5.  Calcified mediastinal lymphadenopathy.      Plan:  pending d/w with the attending        73y/o male w/ hx of HTN, HLD, BPH, varicose veins, hx of ?BCC since 1997. Pt c/o leg swelling x 2 weeks admitted for DVT and PE. Chest CT also shows small lung nodules. Heme/Onc consulted for further evaluation.    #pulmonary embolism unprovoked   #DVT   #lung nodules, rib sclerotic lesion and mediastinal lymphadenopathy suspicion for neoplasm   --Chest CT w/contrast 11/14/2024: 1.  Bilateral pulmonary emboli as described. No CT evidence of right heart strain.  2.  Irregular 1.5 cm right middle lobe pulmonary nodular opacity.   Adjacent surrounding satellite nodularity. This is of indeterminate   etiology with neoplasm on the differential. Recommend further oncologic   workup and consider PET/CT evaluation.  3.  Additional numerous scattered pulmonary nodules, indeterminate.  4.  Indeterminate right seventh rib sclerotic lesion, cannot exclude   neoplastic etiology/metastasis..  5.  Calcified mediastinal lymphadenopathy.    --Pulmonary on board  --obtaining prior records for further assessment  --from pulmonary need to be on AC for at 6 months VS life long   --f/u as outpatient with PMD and Pulm  --needs PET scan as outpatient  --f/u Heme/onc as outpatient  pending d/w with the attending        75y/o male w/ hx of HTN, HLD, BPH, varicose veins, hx of ?BCC since 1997. Pt c/o leg swelling x 2 weeks admitted for DVT and PE. Chest CT also shows small lung nodules. Heme/Onc consulted for further evaluation.    #pulmonary embolism unprovoked   #DVT   #lung nodules, rib sclerotic lesion and mediastinal lymphadenopathy suspicion for neoplasm   --Chest CT w/contrast 11/14/2024: 1.  Bilateral pulmonary emboli as described. No CT evidence of right heart strain.  2.  Irregular 1.5 cm right middle lobe pulmonary nodular opacity.   Adjacent surrounding satellite nodularity. This is of indeterminate   etiology with neoplasm on the differential. Recommend further oncologic   workup and consider PET/CT evaluation.  3.  Additional numerous scattered pulmonary nodules, indeterminate.  4.  Indeterminate right seventh rib sclerotic lesion, cannot exclude   neoplastic etiology/metastasis..  5.  Calcified mediastinal lymphadenopathy.    Plan:  --Pulm obtaining prior records for further assessment  --obtain lung biopsy   --needs PET scan as outpatient  --Pt will continue AC pending the exclusion of malignancy   --f/u with heme/onc for thrombophilia work-up/malignancy as outpatient   --will schedule at [facility] in 1-2 weeks    Pt evaluated with Dr. Mejia; plan as above          75y/o male w/ hx of HTN, HLD, BPH, varicose veins, hx of ?BCC since 1997. Pt c/o leg swelling x 2 weeks admitted for DVT and PE. Chest CT also shows small lung nodules. Heme/Onc consulted for further evaluation.    #pulmonary embolism unprovoked   #DVT   #lung nodules, rib sclerotic lesion and mediastinal lymphadenopathy suspicion for neoplasm   --Chest CT w/contrast 11/14/2024: 1.  Bilateral pulmonary emboli as described. No CT evidence of right heart strain.  2.  Irregular 1.5 cm right middle lobe pulmonary nodular opacity.   Adjacent surrounding satellite nodularity. This is of indeterminate   etiology with neoplasm on the differential. Recommend further oncologic   workup and consider PET/CT evaluation.  3.  Additional numerous scattered pulmonary nodules, indeterminate.  4.  Indeterminate right seventh rib sclerotic lesion, cannot exclude   neoplastic etiology/metastasis..  5.  Calcified mediastinal lymphadenopathy.    Plan:  --Pulm obtaining prior records for further assessment  --obtain lung biopsy   --needs PET scan as outpatient  --Pt will continue AC for now   --f/u with heme/onc for thrombophilia work-up/malignancy as outpatient   --will schedule at [facility] in 1-2 weeks    Pt evaluated with Dr. Meija; plan as above

## 2024-11-15 NOTE — DISCHARGE NOTE PROVIDER - CARE PROVIDERS DIRECT ADDRESSES
,DirectAddress_Unknown,[email or web address]

## 2024-11-15 NOTE — DISCHARGE NOTE NURSING/CASE MANAGEMENT/SOCIAL WORK - NSDCPEFALRISK_GEN_ALL_CORE
For information on Fall & Injury Prevention, visit: [email or web address] OR  [email or web address] OR  https://www.cdc.gov/steadi/patient.html

## 2024-11-15 NOTE — CONSULT NOTE ADULT - ASSESSMENT
Impression:  pulmonary embolism unprovoked   DVT   lung nodule         Plan:  case discussed with the daughter wife and patient , the wife  stated that patient do has hx of lung nodule at the  right side   told them to get the previous ct scan report form PMD and to follow for PMD   as per now told them he need to follow as outpatient need PET scan to be done soon   consider vascular eval for lower ext   echo   on RA   check pox on RA on exertion   from pulmonary need to be on AC for at 6 months VS life long   need outpatient follow up with hematology   can be d/c home on eliquis 10mg Q12 hrs for 7 days and then switch to 5 mg Q12 hrs   recall prn

## 2024-11-15 NOTE — CONSULT NOTE ADULT - SUBJECTIVE AND OBJECTIVE BOX
Hematology/Oncology Consult Note    HPI:  73 y/o male  PmHx HTN, HLD, BPH, hx of varicose vein treatment 23 years ago, ?basal cell skin cancer since 1997 s/p moh's surgery. Pt states he was seen by his PMD yesterday c/o leg swelling x 2 weeks, sent to ED for further work-up. Pt denies any leg trauma, recent surgery or any blood disorder. Pt reports SOB sometimes. Denies any CP, palpitation, abdominal pain, N/V/D, headache, dizziness, blurry vision, numbness or weakness. Denies fevers/chills, night sweats, weight loss or any appetite change. Admits occasional coughing but denies any hemoptysis. Pt denies any clotting disorders in the past.    Upon further review of the patient's history, he reports a known lung nodule identified 10 years ago. He was in the care of a Pulmonologist at that bony, who recommended observation without any interventions, as no active treatment was deemed necessary. Additionally, the patient reports a history of skin cancer, possibly basal cell carcinoma, diagnosed in 1997. He underwent treatment with Mohs surgery and was last seen by his Dermatologist 8 months ago for follow-up.         Allergies    No Known Allergies    Intolerances    MEDICATIONS  (STANDING):  amLODIPine   Tablet 5 milliGRAM(s) Oral daily  atorvastatin 20 milliGRAM(s) Oral at bedtime  enoxaparin Injectable 100 milliGRAM(s) SubCutaneous every 12 hours  tamsulosin 0.4 milliGRAM(s) Oral at bedtime    MEDICATIONS  (PRN):  acetaminophen     Tablet .. 650 milliGRAM(s) Oral every 6 hours PRN Temp greater or equal to 38C (100.4F), Mild Pain (1 - 3)  aluminum hydroxide/magnesium hydroxide/simethicone Suspension 30 milliLiter(s) Oral every 4 hours PRN Dyspepsia  melatonin 3 milliGRAM(s) Oral at bedtime PRN Insomnia  ondansetron Injectable 4 milliGRAM(s) IV Push every 8 hours PRN Nausea and/or Vomiting      PAST MEDICAL & SURGICAL HISTORY:  High cholesterol  Hypertension  COVID-19  H/O left inguinal hernia repair  Skin cancer 1997    FAMILY HISTORY:  Father MI  Mother ovarian cancer    SOCIAL HISTORY: No EtOH, no tobacco    REVIEW OF SYSTEMS:    CONSTITUTIONAL: No weakness, fevers or chills  EYES/ENT: No visual changes;  No vertigo or throat pain   NECK: No pain or stiffness  RESPIRATORY: No cough, wheezing, hemoptysis; No shortness of breath  CARDIOVASCULAR: No chest pain or palpitations  GASTROINTESTINAL: No abdominal or epigastric pain. No nausea, vomiting, or hematemesis; No diarrhea or constipation. No melena or hematochezia.  GENITOURINARY: No dysuria, frequency or hematuria  NEUROLOGICAL: No numbness or weakness  SKIN: No itching, burning, rashes, or lesions   All other review of systems is negative unless indicated above.    Height (cm): 182.9 (11-14 @ 22:16)  Weight (kg): 102.1 (11-14 @ 22:16)  BMI (kg/m2): 30.5 (11-14 @ 22:16)  BSA (m2): 2.24 (11-14 @ 22:16)    T(F): 98.5 (11-15-24 @ 05:24), Max: 98.5 (11-15-24 @ 05:24)  HR: 61 (11-15-24 @ 05:24)  BP: 136/76 (11-15-24 @ 05:24)  RR: 18 (11-15-24 @ 05:24)  SpO2: 94% (11-15-24 @ 05:24)  Wt(kg): --    GENERAL: NAD, well-developed  HEAD:  Atraumatic, Normocephalic  EYES: EOMI, PERRLA, conjunctiva and sclera clear  NECK: Supple, No JVD  CHEST/LUNG: Clear to auscultation bilaterally; No wheeze  HEART: Regular rate and rhythm; No murmurs, rubs, or gallops  ABDOMEN: Soft, Nontender, Nondistended; Bowel sounds present  EXTREMITIES:  2+ Peripheral Pulses, No clubbing, cyanosis, or edema  NEUROLOGY: non-focal  SKIN: No rashes or lesions                          14.5   4.71  )-----------( 170      ( 15 Nov 2024 09:31 )             41.1       11-15    137  |  105  |  16  ----------------------------<  135[H]  4.1   |  22  |  1.3    Ca    9.8      15 Nov 2024 09:31    TPro  6.8  /  Alb  4.9  /  TBili  1.6[H]  /  DBili  x   /  AST  24  /  ALT  25  /  AlkPhos  58  11-15      ACC: [id number] EXAM:  DUPLEX SCAN EXT VEINS LOWER BI   ORDERED BY: ASAD HENAO     PROCEDURE DATE:  11/14/2024      INTERPRETATION:  CLINICAL INFORMATION: Patient is a 74-year-old male   complaining of lower extremity swelling    TECHNIQUE: Duplex sonography of the BILATERAL LOWER extremity veins with   color and spectral Doppler, with and without compression.    FINDINGS:    RIGHT:  Normal compressibility of the RIGHT common femoral, femoral and popliteal   veins.  Doppler examination shows normal spontaneous and phasic flow.  No RIGHT calf vein thrombosis is detected.    LEFT:  Normal compressibility of the LEFT common femoral veins.  The left distal femoral vein, popliteal vein, posterior tibial vein,   peroneal vein all positive for acute DVT    IMPRESSION:  No evidence of deep venous thrombosis in right lower extremity  There is left distal femoral vein, popliteal vein, posterior tibial vein,   and peroneal vein DVTs      --- End of Report ---    MARY ANN MARQUEZ   This document has been electronically signed.  RUI FRY MD; Attending Vascular Surgery  This document has been electronically signed. Nov 15 2024  8:54AM      ACC: [id number] EXAM:  CT ANGIO CHEST PULM [facility]   ORDERED BY: YONY ALICIA     PROCEDURE DATE:  11/14/2024          INTERPRETATION:  CLINICAL INFORMATION: Rule out PE, positive DVT    COMPARISON: None.    CONTRAST:  IV Contrast: Omnipaque 350  70 cc administered   30 cc discarded    PROCEDURE:  CT Angiogram of the chest was obtained with intravenous contrast. Three   dimensional maximum intensity projection (MIP) images were generated.    FINDINGS:    PULMONARY ANGIOGRAM: Right lower lobar and segmental pulmonary emboli.   Right upper lobe segmental pulmonary emboli. Left upper lobe segmental   pulmonary emboli. No CT evidence of right heart strain.    AIRWAYS/LUNGS/PLEURA: Irregular 1.5 cm right middle lobe pulmonary   nodular opacity. Adjacent surrounding satellite nodularity. Additional   numerous scattered nodules, for example 5 mm right upper lobe nodule   (303/80), 3 mm right upper lobe nodule (303/67) among many others, best   seen on MIP images. No pleural effusion or pneumothorax.    MEDIASTINUM AND FLACO: Calcified mucinous lymphadenopathy    HEART: Coronary and aortic calcifications. No pericardial effusion.    VISUALIZED UPPER ABDOMEN: Within normal limits.    CHEST WALL AND BONES: Indeterminate right seventh rib sclerotic lesion   (301/49).    IMPRESSION:    1.  Bilateral pulmonary emboli as described. No CT evidence of right   heart strain.  2.  Irregular 1.5 cm right middle lobe pulmonary nodular opacity.   Adjacent surrounding satellite nodularity. This is of indeterminate   etiology with neoplasm on the differential. Recommend further oncologic   workup and consider PET/CT evaluation.  3.  Additional numerous scattered pulmonary nodules, indeterminate.  4.  Indeterminate right seventh rib sclerotic lesion, cannot exclude   neoplastic etiology/metastasis..  5.  Calcified mediastinal lymphadenopathy.    Preliminary findings discussed with RED Alicia on 11/14/24 at 8:18 PM.    --- End of Report ---  ADAM LYLES MD; Attending Radiologist  This document has been electronically signed. Nov 14 2024  8:35PM  Abnormal. Hematology/Oncology Consult Note    HPI:  75 y/o male  PmHx HTN, HLD, BPH, hx of varicose vein treatment 23 years ago, ?basal cell skin cancer since 1997 s/p moh's surgery. Pt states he was seen by his PMD yesterday c/o leg swelling x 2 weeks, sent to ED for further work-up. Pt denies any leg trauma, recent surgery or any blood disorder. Pt reports SOB sometimes. Denies any CP, palpitation, abdominal pain, N/V/D, headache, dizziness, blurry vision, numbness or weakness. Denies fevers/chills, night sweats, weight loss or any appetite change. Admits occasional coughing but denies any hemoptysis. Pt denies any clotting disorders in the past. Pt is a former smoker; quit 30 years ago. Last colonoscopy 1 year ago wnl per pt.     Upon further review of the patient's history, he reports a known lung nodule identified 10 years ago. He was in the care of a Pulmonologist at that bony, who recommended observation without any interventions, as no active treatment was deemed necessary. Additionally, the patient reports a history of skin cancer, possibly basal cell carcinoma, diagnosed in 1997. He underwent treatment with Mohs surgery and was last seen by his Dermatologist 8 months ago for follow-up.         Allergies    No Known Allergies    Intolerances    MEDICATIONS  (STANDING):  amLODIPine   Tablet 5 milliGRAM(s) Oral daily  atorvastatin 20 milliGRAM(s) Oral at bedtime  enoxaparin Injectable 100 milliGRAM(s) SubCutaneous every 12 hours  tamsulosin 0.4 milliGRAM(s) Oral at bedtime    MEDICATIONS  (PRN):  acetaminophen     Tablet .. 650 milliGRAM(s) Oral every 6 hours PRN Temp greater or equal to 38C (100.4F), Mild Pain (1 - 3)  aluminum hydroxide/magnesium hydroxide/simethicone Suspension 30 milliLiter(s) Oral every 4 hours PRN Dyspepsia  melatonin 3 milliGRAM(s) Oral at bedtime PRN Insomnia  ondansetron Injectable 4 milliGRAM(s) IV Push every 8 hours PRN Nausea and/or Vomiting      PAST MEDICAL & SURGICAL HISTORY:  High cholesterol  Hypertension  COVID-19  H/O left inguinal hernia repair  Skin cancer 1997    FAMILY HISTORY:  Father MI  Mother ovarian cancer    SOCIAL HISTORY: No EtOH, no tobacco    REVIEW OF SYSTEMS:    CONSTITUTIONAL: No weakness, fevers or chills  EYES/ENT: No visual changes;  No vertigo or throat pain   NECK: No pain or stiffness  RESPIRATORY: No cough, wheezing, hemoptysis; No shortness of breath  CARDIOVASCULAR: No chest pain or palpitations  GASTROINTESTINAL: No abdominal or epigastric pain. No nausea, vomiting, or hematemesis; No diarrhea or constipation. No melena or hematochezia.  GENITOURINARY: No dysuria, frequency or hematuria  NEUROLOGICAL: No numbness or weakness  SKIN: No itching, burning, rashes, or lesions   All other review of systems is negative unless indicated above.    Height (cm): 182.9 (11-14 @ 22:16)  Weight (kg): 102.1 (11-14 @ 22:16)  BMI (kg/m2): 30.5 (11-14 @ 22:16)  BSA (m2): 2.24 (11-14 @ 22:16)    T(F): 98.5 (11-15-24 @ 05:24), Max: 98.5 (11-15-24 @ 05:24)  HR: 61 (11-15-24 @ 05:24)  BP: 136/76 (11-15-24 @ 05:24)  RR: 18 (11-15-24 @ 05:24)  SpO2: 94% (11-15-24 @ 05:24)  Wt(kg): --    GENERAL: NAD, well-developed  HEAD:  Atraumatic, Normocephalic  EYES: EOMI, PERRLA, conjunctiva and sclera clear  NECK: Supple, No JVD  CHEST/LUNG: Clear to auscultation bilaterally; No wheeze  HEART: Regular rate and rhythm; No murmurs, rubs, or gallops  ABDOMEN: Soft, Nontender, Nondistended; Bowel sounds present  EXTREMITIES:  2+ Peripheral Pulses, No clubbing, cyanosis, or edema  NEUROLOGY: non-focal  SKIN: No rashes or lesions                          14.5   4.71  )-----------( 170      ( 15 Nov 2024 09:31 )             41.1       11-15    137  |  105  |  16  ----------------------------<  135[H]  4.1   |  22  |  1.3    Ca    9.8      15 Nov 2024 09:31    TPro  6.8  /  Alb  4.9  /  TBili  1.6[H]  /  DBili  x   /  AST  24  /  ALT  25  /  AlkPhos  58  11-15      ACC: [id number] EXAM:  DUPLEX SCAN EXT VEINS LOWER BI   ORDERED BY: ASAD HENAO     PROCEDURE DATE:  11/14/2024      INTERPRETATION:  CLINICAL INFORMATION: Patient is a 74-year-old male   complaining of lower extremity swelling    TECHNIQUE: Duplex sonography of the BILATERAL LOWER extremity veins with   color and spectral Doppler, with and without compression.    FINDINGS:    RIGHT:  Normal compressibility of the RIGHT common femoral, femoral and popliteal   veins.  Doppler examination shows normal spontaneous and phasic flow.  No RIGHT calf vein thrombosis is detected.    LEFT:  Normal compressibility of the LEFT common femoral veins.  The left distal femoral vein, popliteal vein, posterior tibial vein,   peroneal vein all positive for acute DVT    IMPRESSION:  No evidence of deep venous thrombosis in right lower extremity  There is left distal femoral vein, popliteal vein, posterior tibial vein,   and peroneal vein DVTs      --- End of Report ---    MARY ANN MARQUEZ   This document has been electronically signed.  RUI FRY MD; Attending Vascular Surgery  This document has been electronically signed. Nov 15 2024  8:54AM      ACC: [id number] EXAM:  [facility]   ORDERED BY: YONY ALICIA     PROCEDURE DATE:  11/14/2024          INTERPRETATION:  CLINICAL INFORMATION: Rule out PE, positive DVT    COMPARISON: None.    CONTRAST:  IV Contrast: Omnipaque 350  70 cc administered   30 cc discarded    PROCEDURE:  CT Angiogram of the chest was obtained with intravenous contrast. Three   dimensional maximum intensity projection (MIP) images were generated.    FINDINGS:    PULMONARY ANGIOGRAM: Right lower lobar and segmental pulmonary emboli.   Right upper lobe segmental pulmonary emboli. Left upper lobe segmental   pulmonary emboli. No CT evidence of right heart strain.    AIRWAYS/LUNGS/PLEURA: Irregular 1.5 cm right middle lobe pulmonary   nodular opacity. Adjacent surrounding satellite nodularity. Additional   numerous scattered nodules, for example 5 mm right upper lobe nodule   (303/80), 3 mm right upper lobe nodule (303/67) among many others, best   seen on MIP images. No pleural effusion or pneumothorax.    MEDIASTINUM AND FLACO: Calcified mucinous lymphadenopathy    HEART: Coronary and aortic calcifications. No pericardial effusion.    VISUALIZED UPPER ABDOMEN: Within normal limits.    CHEST WALL AND BONES: Indeterminate right seventh rib sclerotic lesion   (301/49).    IMPRESSION:    1.  Bilateral pulmonary emboli as described. No CT evidence of right   heart strain.  2.  Irregular 1.5 cm right middle lobe pulmonary nodular opacity.   Adjacent surrounding satellite nodularity. This is of indeterminate   etiology with neoplasm on the differential. Recommend further oncologic   workup and consider PET/CT evaluation.  3.  Additional numerous scattered pulmonary nodules, indeterminate.  4.  Indeterminate right seventh rib sclerotic lesion, cannot exclude   neoplastic etiology/metastasis..  5.  Calcified mediastinal lymphadenopathy.    Preliminary findings discussed with RED Alicia on 11/14/24 at 8:18 PM.    --- End of Report ---  ADAM LYLES MD; Attending Radiologist  This document has been electronically signed. Nov 14 2024  8:35PM  Abnormal.

## 2024-11-16 ENCOUNTER — TRANSCRIPTION ENCOUNTER (OUTPATIENT)
Age: 74
End: 2024-11-16

## 2024-11-18 ENCOUNTER — TRANSCRIPTION ENCOUNTER (OUTPATIENT)
Age: 74
End: 2024-11-18

## 2024-11-21 ENCOUNTER — APPOINTMENT (OUTPATIENT)
Dept: PULMONOLOGY | Facility: CLINIC | Age: 74
End: 2024-11-21
Payer: MEDICARE

## 2024-11-21 VITALS
OXYGEN SATURATION: 98 % | SYSTOLIC BLOOD PRESSURE: 110 MMHG | WEIGHT: 225 LBS | HEART RATE: 81 BPM | DIASTOLIC BLOOD PRESSURE: 64 MMHG | BODY MASS INDEX: 30.52 KG/M2 | RESPIRATION RATE: 14 BRPM

## 2024-11-21 DIAGNOSIS — Z87.891 PERSONAL HISTORY OF NICOTINE DEPENDENCE: ICD-10-CM

## 2024-11-21 DIAGNOSIS — R91.1 SOLITARY PULMONARY NODULE: ICD-10-CM

## 2024-11-21 DIAGNOSIS — Z86.79 PERSONAL HISTORY OF OTHER DISEASES OF THE CIRCULATORY SYSTEM: ICD-10-CM

## 2024-11-21 DIAGNOSIS — Z86.39 PERSONAL HISTORY OF OTHER ENDOCRINE, NUTRITIONAL AND METABOLIC DISEASE: ICD-10-CM

## 2024-11-21 DIAGNOSIS — I26.99 OTHER PULMONARY EMBOLISM W/OUT ACUTE COR PULMONALE: ICD-10-CM

## 2024-11-21 PROCEDURE — 99495 TRANSJ CARE MGMT MOD F2F 14D: CPT

## 2024-11-21 RX ORDER — ATORVASTATIN CALCIUM 20 MG/1
20 TABLET, FILM COATED ORAL
Refills: 0 | Status: ACTIVE | COMMUNITY

## 2024-11-21 RX ORDER — TAMSULOSIN HYDROCHLORIDE 0.4 MG/1
0.4 CAPSULE ORAL
Refills: 0 | Status: ACTIVE | COMMUNITY

## 2024-11-21 RX ORDER — APIXABAN 5 MG/1
5 TABLET, FILM COATED ORAL
Refills: 0 | Status: ACTIVE | COMMUNITY

## 2024-11-21 RX ORDER — AMLODIPINE BESYLATE 10 MG/1
10 TABLET ORAL
Refills: 0 | Status: ACTIVE | COMMUNITY

## 2024-11-22 DIAGNOSIS — I82.4Z2 ACUTE EMBOLISM AND THROMBOSIS OF UNSPECIFIED DEEP VEINS OF LEFT DISTAL LOWER EXTREMITY: ICD-10-CM

## 2024-11-22 DIAGNOSIS — E78.5 HYPERLIPIDEMIA, UNSPECIFIED: ICD-10-CM

## 2024-11-22 DIAGNOSIS — D68.59 OTHER PRIMARY THROMBOPHILIA: ICD-10-CM

## 2024-11-22 DIAGNOSIS — Z79.02 LONG TERM (CURRENT) USE OF ANTITHROMBOTICS/ANTIPLATELETS: ICD-10-CM

## 2024-11-22 DIAGNOSIS — Z87.891 PERSONAL HISTORY OF NICOTINE DEPENDENCE: ICD-10-CM

## 2024-11-22 DIAGNOSIS — Z85.828 PERSONAL HISTORY OF OTHER MALIGNANT NEOPLASM OF SKIN: ICD-10-CM

## 2024-11-22 DIAGNOSIS — N40.0 BENIGN PROSTATIC HYPERPLASIA WITHOUT LOWER URINARY TRACT SYMPTOMS: ICD-10-CM

## 2024-11-22 DIAGNOSIS — I26.99 OTHER PULMONARY EMBOLISM WITHOUT ACUTE COR PULMONALE: ICD-10-CM

## 2024-11-22 DIAGNOSIS — R59.9 ENLARGED LYMPH NODES, UNSPECIFIED: ICD-10-CM

## 2024-11-22 DIAGNOSIS — R91.1 SOLITARY PULMONARY NODULE: ICD-10-CM

## 2024-11-22 DIAGNOSIS — I10 ESSENTIAL (PRIMARY) HYPERTENSION: ICD-10-CM

## 2024-11-27 ENCOUNTER — RESULT REVIEW (OUTPATIENT)
Age: 74
End: 2024-11-27

## 2024-11-27 ENCOUNTER — OUTPATIENT (OUTPATIENT)
Dept: OUTPATIENT SERVICES | Facility: HOSPITAL | Age: 74
LOS: 1 days | End: 2024-11-27
Payer: MEDICARE

## 2024-11-27 DIAGNOSIS — Z98.890 OTHER SPECIFIED POSTPROCEDURAL STATES: Chronic | ICD-10-CM

## 2024-11-27 DIAGNOSIS — R91.1 SOLITARY PULMONARY NODULE: ICD-10-CM

## 2024-11-27 LAB — GLUCOSE BLDC GLUCOMTR-MCNC: 93 MG/DL — SIGNIFICANT CHANGE UP (ref 70–99)

## 2024-11-27 PROCEDURE — 78815 PET IMAGE W/CT SKULL-THIGH: CPT | Mod: 26,PI

## 2024-11-27 PROCEDURE — 82962 GLUCOSE BLOOD TEST: CPT

## 2024-11-27 PROCEDURE — A9552: CPT

## 2024-11-27 PROCEDURE — 78815 PET IMAGE W/CT SKULL-THIGH: CPT | Mod: PI

## 2024-11-28 DIAGNOSIS — R91.1 SOLITARY PULMONARY NODULE: ICD-10-CM

## 2024-11-29 ENCOUNTER — NON-APPOINTMENT (OUTPATIENT)
Age: 74
End: 2024-11-29

## 2024-12-16 ENCOUNTER — NON-APPOINTMENT (OUTPATIENT)
Age: 74
End: 2024-12-16

## 2024-12-17 ENCOUNTER — TRANSCRIPTION ENCOUNTER (OUTPATIENT)
Age: 74
End: 2024-12-17

## 2025-01-03 ENCOUNTER — NON-APPOINTMENT (OUTPATIENT)
Age: 75
End: 2025-01-03

## 2025-01-16 ENCOUNTER — OUTPATIENT (OUTPATIENT)
Dept: OUTPATIENT SERVICES | Facility: HOSPITAL | Age: 75
LOS: 1 days | End: 2025-01-16
Payer: MEDICARE

## 2025-01-16 ENCOUNTER — RESULT REVIEW (OUTPATIENT)
Age: 75
End: 2025-01-16

## 2025-01-16 DIAGNOSIS — Z98.890 OTHER SPECIFIED POSTPROCEDURAL STATES: Chronic | ICD-10-CM

## 2025-01-16 DIAGNOSIS — R91.1 SOLITARY PULMONARY NODULE: ICD-10-CM

## 2025-01-16 DIAGNOSIS — Z00.8 ENCOUNTER FOR OTHER GENERAL EXAMINATION: ICD-10-CM

## 2025-01-16 PROCEDURE — 71250 CT THORAX DX C-: CPT

## 2025-01-16 PROCEDURE — 71250 CT THORAX DX C-: CPT | Mod: 26

## 2025-01-17 ENCOUNTER — NON-APPOINTMENT (OUTPATIENT)
Age: 75
End: 2025-01-17

## 2025-01-17 DIAGNOSIS — R91.1 SOLITARY PULMONARY NODULE: ICD-10-CM

## 2025-01-22 ENCOUNTER — APPOINTMENT (OUTPATIENT)
Dept: PULMONOLOGY | Facility: CLINIC | Age: 75
End: 2025-01-22
Payer: MEDICARE

## 2025-01-22 VITALS
WEIGHT: 225 LBS | DIASTOLIC BLOOD PRESSURE: 70 MMHG | OXYGEN SATURATION: 99 % | SYSTOLIC BLOOD PRESSURE: 122 MMHG | HEIGHT: 72 IN | HEART RATE: 86 BPM | BODY MASS INDEX: 30.48 KG/M2

## 2025-01-22 DIAGNOSIS — I26.99 OTHER PULMONARY EMBOLISM W/OUT ACUTE COR PULMONALE: ICD-10-CM

## 2025-01-22 DIAGNOSIS — R91.1 SOLITARY PULMONARY NODULE: ICD-10-CM

## 2025-01-22 PROCEDURE — G2211 COMPLEX E/M VISIT ADD ON: CPT

## 2025-01-22 PROCEDURE — 99214 OFFICE O/P EST MOD 30 MIN: CPT

## 2025-02-05 ENCOUNTER — NON-APPOINTMENT (OUTPATIENT)
Age: 75
End: 2025-02-05

## 2025-02-10 ENCOUNTER — APPOINTMENT (OUTPATIENT)
Dept: CARDIOLOGY | Facility: CLINIC | Age: 75
End: 2025-02-10
Payer: MEDICARE

## 2025-02-10 ENCOUNTER — NON-APPOINTMENT (OUTPATIENT)
Age: 75
End: 2025-02-10

## 2025-02-10 VITALS
HEART RATE: 73 BPM | WEIGHT: 225 LBS | HEIGHT: 72 IN | SYSTOLIC BLOOD PRESSURE: 100 MMHG | DIASTOLIC BLOOD PRESSURE: 70 MMHG | BODY MASS INDEX: 30.48 KG/M2

## 2025-02-10 DIAGNOSIS — Z83.2 FAMILY HISTORY OF DISEASES OF THE BLOOD AND BLOOD-FORMING ORGANS AND CERTAIN DISORDERS INVOLVING THE IMMUNE MECHANISM: ICD-10-CM

## 2025-02-10 DIAGNOSIS — E78.5 HYPERLIPIDEMIA, UNSPECIFIED: ICD-10-CM

## 2025-02-10 DIAGNOSIS — E66.09 OTHER OBESITY DUE TO EXCESS CALORIES: ICD-10-CM

## 2025-02-10 DIAGNOSIS — I25.10 ATHEROSCLEROTIC HEART DISEASE OF NATIVE CORONARY ARTERY W/OUT ANGINA PECTORIS: ICD-10-CM

## 2025-02-10 DIAGNOSIS — I26.99 OTHER PULMONARY EMBOLISM W/OUT ACUTE COR PULMONALE: ICD-10-CM

## 2025-02-10 DIAGNOSIS — R91.1 SOLITARY PULMONARY NODULE: ICD-10-CM

## 2025-02-10 DIAGNOSIS — K40.90 UNILATERAL INGUINAL HERNIA, W/OUT OBSTRUCTION OR GANGRENE, NOT SPECIFIED AS RECURRENT: ICD-10-CM

## 2025-02-10 PROCEDURE — 93000 ELECTROCARDIOGRAM COMPLETE: CPT

## 2025-02-10 PROCEDURE — 99204 OFFICE O/P NEW MOD 45 MIN: CPT | Mod: 25

## 2025-02-26 NOTE — ASU PATIENT PROFILE, ADULT - ABLE TO REACH PT
"   Internal Medicine      Patient ID: [id number]     Chief Complaint: Medicare Annual Wellness     HPI:     Dipika Owusu is a 79 y.o. female here today for a Medicare Annual Wellness visit and comprehensive Health Risk Assessment.  She was also here to follow-up on numerous medical problems.      She feels okay overall but she feels like her forgetfulness is getting worse.  Her daughter was concerned about it.    He does see her cardiologist on a regular basis and everything was good on that side.      The following components were reviewed and updated:  Medical history  Family History  Social history  Allergies  Immunizations  Health Maintenance  Patient Care Team  Current Outpatient Medications   Medication Instructions    amLODIPine (NORVASC) 10 mg, Oral, Daily    aspirin (ECOTRIN) 81 mg, Oral, Daily    diphenhydrAMINE (SOMINEX) 50 mg, Oral, Daily    ezetimibe (ZETIA) 10 mg, Oral, Daily    olmesartan (BENICAR) 40 mg, Oral, Daily    pravastatin (PRAVACHOL) 40 mg, Oral, Nightly       Subjective:     Review of Systems   All other systems reviewed and are negative.      12 point review of systems conducted, negative except as stated in the history of present illness. See HPI for details.    Objective:     Visit Vitals  /69 (BP Location: Right arm, Patient Position: Sitting, BP Method: Large (Manual))   Pulse 78   Resp 18   Ht 5' 2" (1.575 m)   Wt 64.9 kg (143 lb)   SpO2 (!) 94%   BMI 26.16 kg/m²       Physical Exam  Vitals reviewed.   Constitutional:       Appearance: Normal appearance.   HENT:      Head: Normocephalic.      Nose: Nose normal.      Mouth/Throat:      Pharynx: Oropharynx is clear.   Eyes:      Pupils: Pupils are equal, round, and reactive to light.   Neck:      Thyroid: No thyromegaly.   Cardiovascular:      Rate and Rhythm: Normal rate and regular rhythm.      Pulses: Normal pulses.   Pulmonary:      Breath sounds: Normal breath sounds.   Abdominal:      General: Abdomen is flat. Bowel sounds " are normal.      Palpations: Abdomen is soft. There is no hepatomegaly, splenomegaly or mass.      Tenderness: There is no abdominal tenderness.   Musculoskeletal:      Cervical back: Neck supple.   Lymphadenopathy:      Cervical: No cervical adenopathy.   Skin:     General: Skin is warm and dry.   Neurological:      Mental Status: She is alert.     Lab work reviewed    Assessment:   1. Wellness     2. Hypertension.  Good control on current meds     3. Hyperlipidemia.  Good control on current meds     4. Coronary disease.  Followed by Dr. Valentino.  Stable     5. History of GERD.  Stable     6. Cognitive decline.  Rule out early dementia     7. In need of screening mammogram and bone density study    Plan:     Check MMSE today.  If it is abnormal then proceed with CT scan brain without contrast and B12 level.  If meds started we will see her back in a few months.  If no need for medications then  follow-up yearly with CBC CMP lipid TSH prior    [] ACP Discussed - Has Living Will and HCPOA on file. Will provide our office with copies.  [x] ACP Discussion declined.    A comprehensive HEALTH RISK ASSESSMENT was completed today. Results are summarized below:                  The patient is NOT A TOBACCO USER.        All Questions regarding food, transportation or housing were not answered today.    Orders Placed This Encounter   Procedures    Mammo Digital Screening Bilat     Standing Status:   Standing     Number of Occurrences:   20     Standing Expiration Date:   2/19/2216     Order Specific Question:   Has patient had radiation?     Answer:   No     Order Specific Question:   Has the patient had chemotherapy?     Answer:   No     Order Specific Question:   May the Radiologist modify the order per protocol to meet the clinical needs of the patient?     Answer:   Yes    DXA Bone Density Axial Skeleton 1 or more sites     Standing Status:   Future     Standing Expiration Date:   6/24/2025    CBC Auto Differential      Standing Status:   Future     Standing Expiration Date:   6/24/2026    Comprehensive Metabolic Panel     Standing Status:   Future     Standing Expiration Date:   6/24/2026    Lipid Panel     Standing Status:   Future     Standing Expiration Date:   6/24/2026    TSH     Standing Status:   Future     Standing Expiration Date:   6/24/2026        Medication List with Changes/Refills   Current Medications    ASPIRIN (ECOTRIN) 81 MG EC TABLET    Take 81 mg by mouth once daily.       Start Date: --        End Date: --    DIPHENHYDRAMINE (SOMINEX) 25 MG TABLET    Take 50 mg by mouth once daily.       Start Date: 6/16/2021 End Date: --   Changed and/or Refilled Medications    Modified Medication Previous Medication    AMLODIPINE (NORVASC) 10 MG TABLET amLODIPine (NORVASC) 10 MG tablet       Take 1 tablet (10 mg total) by mouth once daily.    TAKE 1 TABLET EVERY DAY       Start Date: 6/24/2024 End Date: --    Start Date: 9/8/2023  End Date: 6/24/2024    EZETIMIBE (ZETIA) 10 MG TABLET ezetimibe (ZETIA) 10 mg tablet       Take 1 tablet (10 mg total) by mouth once daily.    Take 1 tablet by mouth once daily.       Start Date: 6/24/2024 End Date: --    Start Date: 4/21/2022 End Date: 6/24/2024    OLMESARTAN (BENICAR) 40 MG TABLET olmesartan (BENICAR) 40 MG tablet       Take 1 tablet (40 mg total) by mouth once daily.    Take 1 tablet by mouth once daily.       Start Date: 6/24/2024 End Date: --    Start Date: 3/31/2022 End Date: 6/24/2024    PRAVASTATIN (PRAVACHOL) 40 MG TABLET pravastatin (PRAVACHOL) 40 MG tablet       Take 1 tablet (40 mg total) by mouth every evening.    Take 1 tablet by mouth once daily.       Start Date: 6/24/2024 End Date: --    Start Date: 4/21/2022 End Date: 6/24/2024   Discontinued Medications    ESCITALOPRAM OXALATE (LEXAPRO ORAL)    Lexapro Take No date recorded No form recorded No frequency recorded No route recorded No set duration recorded No set duration amount recorded suspended No dosage  strength recorded No dosage strength units of measure recorded       Start Date: --        End Date: 6/24/2024    LISINOPRIL-HYDROCHLOROTHIAZIDE (PRINZIDE,ZESTORETIC) 20-12.5 MG PER TABLET    Take 1 tablet by mouth once daily.       Start Date: --        End Date: 6/24/2024    METHOCARBAMOL (ROBAXIN) 750 MG TAB    Take 1 tablet by mouth every evening.       Start Date: --        End Date: 6/24/2024    OLMESARTAN-HYDROCHLOROTHIAZIDE (BENICAR HCT) 20-12.5 MG PER TABLET           Start Date: --        End Date: 6/24/2024        Provided patient with a 5-10 year written screening schedule and personal prevention plan. Recommendations were developed using the USPSTF age appropriate recommendations. Education, counseling, and referrals were provided as needed. After Visit Summary printed and given to patient, which includes a list of additional screenings\tests needed.    Follow up in about 1 year (around 6/24/2025). In addition to their scheduled follow up, the patient has also been instructed to follow up on as needed basis.     No future appointments.     Salvador Guerra MD    yes

## 2025-02-27 ENCOUNTER — RESULT REVIEW (OUTPATIENT)
Age: 75
End: 2025-02-27

## 2025-02-27 ENCOUNTER — INPATIENT (INPATIENT)
Facility: HOSPITAL | Age: 75
LOS: 0 days | Discharge: HOME | DRG: 206 | End: 2025-02-28
Attending: INTERNAL MEDICINE | Admitting: STUDENT IN AN ORGANIZED HEALTH CARE EDUCATION/TRAINING PROGRAM
Payer: MEDICARE

## 2025-02-27 VITALS
SYSTOLIC BLOOD PRESSURE: 115 MMHG | HEIGHT: 71 IN | DIASTOLIC BLOOD PRESSURE: 69 MMHG | WEIGHT: 225.09 LBS | HEART RATE: 61 BPM | OXYGEN SATURATION: 98 % | TEMPERATURE: 97 F | RESPIRATION RATE: 16 BRPM

## 2025-02-27 DIAGNOSIS — Z98.890 OTHER SPECIFIED POSTPROCEDURAL STATES: Chronic | ICD-10-CM

## 2025-02-27 DIAGNOSIS — R91.1 SOLITARY PULMONARY NODULE: ICD-10-CM

## 2025-02-27 DIAGNOSIS — I26.99 OTHER PULMONARY EMBOLISM WITHOUT ACUTE COR PULMONALE: ICD-10-CM

## 2025-02-27 LAB
ALBUMIN SERPL ELPH-MCNC: 5 G/DL — SIGNIFICANT CHANGE UP (ref 3.5–5.2)
ALP SERPL-CCNC: 60 U/L — SIGNIFICANT CHANGE UP (ref 30–115)
ALT FLD-CCNC: 25 U/L — SIGNIFICANT CHANGE UP (ref 0–41)
ANION GAP SERPL CALC-SCNC: 12 MMOL/L — SIGNIFICANT CHANGE UP (ref 7–14)
AST SERPL-CCNC: 21 U/L — SIGNIFICANT CHANGE UP (ref 0–41)
BASOPHILS # BLD AUTO: 0.01 K/UL — SIGNIFICANT CHANGE UP (ref 0–0.2)
BASOPHILS NFR BLD AUTO: 0.2 % — SIGNIFICANT CHANGE UP (ref 0–1)
BILIRUB SERPL-MCNC: 1.1 MG/DL — SIGNIFICANT CHANGE UP (ref 0.2–1.2)
BUN SERPL-MCNC: 19 MG/DL — SIGNIFICANT CHANGE UP (ref 10–20)
CALCIUM SERPL-MCNC: 9.3 MG/DL — SIGNIFICANT CHANGE UP (ref 8.4–10.5)
CHLORIDE SERPL-SCNC: 103 MMOL/L — SIGNIFICANT CHANGE UP (ref 98–110)
CO2 SERPL-SCNC: 21 MMOL/L — SIGNIFICANT CHANGE UP (ref 17–32)
CREAT SERPL-MCNC: 1.3 MG/DL — SIGNIFICANT CHANGE UP (ref 0.7–1.5)
EGFR: 58 ML/MIN/1.73M2 — LOW
EOSINOPHIL # BLD AUTO: 0.01 K/UL — SIGNIFICANT CHANGE UP (ref 0–0.7)
EOSINOPHIL NFR BLD AUTO: 0.2 % — SIGNIFICANT CHANGE UP (ref 0–8)
GLUCOSE SERPL-MCNC: 112 MG/DL — HIGH (ref 70–99)
HCT VFR BLD CALC: 44.1 % — SIGNIFICANT CHANGE UP (ref 42–52)
HGB BLD-MCNC: 15.3 G/DL — SIGNIFICANT CHANGE UP (ref 14–18)
IMM GRANULOCYTES NFR BLD AUTO: 0.2 % — SIGNIFICANT CHANGE UP (ref 0.1–0.3)
LYMPHOCYTES # BLD AUTO: 0.59 K/UL — LOW (ref 1.2–3.4)
LYMPHOCYTES # BLD AUTO: 9.5 % — LOW (ref 20.5–51.1)
MAGNESIUM SERPL-MCNC: 2 MG/DL — SIGNIFICANT CHANGE UP (ref 1.8–2.4)
MCHC RBC-ENTMCNC: 31 PG — SIGNIFICANT CHANGE UP (ref 27–31)
MCHC RBC-ENTMCNC: 34.7 G/DL — SIGNIFICANT CHANGE UP (ref 32–37)
MCV RBC AUTO: 89.5 FL — SIGNIFICANT CHANGE UP (ref 80–94)
MONOCYTES # BLD AUTO: 0.07 K/UL — LOW (ref 0.1–0.6)
MONOCYTES NFR BLD AUTO: 1.1 % — LOW (ref 1.7–9.3)
NEUTROPHILS # BLD AUTO: 5.5 K/UL — SIGNIFICANT CHANGE UP (ref 1.4–6.5)
NEUTROPHILS NFR BLD AUTO: 88.8 % — HIGH (ref 42.2–75.2)
NRBC BLD AUTO-RTO: 0 /100 WBCS — SIGNIFICANT CHANGE UP (ref 0–0)
PLATELET # BLD AUTO: 178 K/UL — SIGNIFICANT CHANGE UP (ref 130–400)
PMV BLD: 10.2 FL — SIGNIFICANT CHANGE UP (ref 7.4–10.4)
POTASSIUM SERPL-MCNC: 5 MMOL/L — SIGNIFICANT CHANGE UP (ref 3.5–5)
POTASSIUM SERPL-SCNC: 5 MMOL/L — SIGNIFICANT CHANGE UP (ref 3.5–5)
PROT SERPL-MCNC: 6.8 G/DL — SIGNIFICANT CHANGE UP (ref 6–8)
RBC # BLD: 4.93 M/UL — SIGNIFICANT CHANGE UP (ref 4.7–6.1)
RBC # FLD: 12.7 % — SIGNIFICANT CHANGE UP (ref 11.5–14.5)
SODIUM SERPL-SCNC: 136 MMOL/L — SIGNIFICANT CHANGE UP (ref 135–146)
WBC # BLD: 6.19 K/UL — SIGNIFICANT CHANGE UP (ref 4.8–10.8)
WBC # FLD AUTO: 6.19 K/UL — SIGNIFICANT CHANGE UP (ref 4.8–10.8)

## 2025-02-27 PROCEDURE — C9399: CPT

## 2025-02-27 PROCEDURE — 31628 BRONCHOSCOPY/LUNG BX EACH: CPT | Mod: XU

## 2025-02-27 PROCEDURE — 31629 BRONCHOSCOPY/NEEDLE BX EACH: CPT | Mod: XU

## 2025-02-27 PROCEDURE — 31652 BRONCH EBUS SAMPLNG 1/2 NODE: CPT

## 2025-02-27 PROCEDURE — 88312 SPECIAL STAINS GROUP 1: CPT | Mod: 26

## 2025-02-27 PROCEDURE — 36415 COLL VENOUS BLD VENIPUNCTURE: CPT

## 2025-02-27 PROCEDURE — 71045 X-RAY EXAM CHEST 1 VIEW: CPT | Mod: 26,77

## 2025-02-27 PROCEDURE — 71045 X-RAY EXAM CHEST 1 VIEW: CPT | Mod: 26

## 2025-02-27 PROCEDURE — 71045 X-RAY EXAM CHEST 1 VIEW: CPT

## 2025-02-27 PROCEDURE — 31627 NAVIGATIONAL BRONCHOSCOPY: CPT | Mod: XU

## 2025-02-27 PROCEDURE — 80053 COMPREHEN METABOLIC PANEL: CPT

## 2025-02-27 PROCEDURE — 31654 BRONCH EBUS IVNTJ PERPH LES: CPT | Mod: XU

## 2025-02-27 PROCEDURE — 85025 COMPLETE CBC W/AUTO DIFF WBC: CPT

## 2025-02-27 PROCEDURE — 88305 TISSUE EXAM BY PATHOLOGIST: CPT | Mod: 26

## 2025-02-27 PROCEDURE — 83735 ASSAY OF MAGNESIUM: CPT

## 2025-02-27 PROCEDURE — 99222 1ST HOSP IP/OBS MODERATE 55: CPT

## 2025-02-27 PROCEDURE — 88173 CYTOPATH EVAL FNA REPORT: CPT | Mod: 26

## 2025-02-27 RX ORDER — IRBESARTAN 75 MG/1
1 TABLET ORAL
Refills: 0 | DISCHARGE

## 2025-02-27 RX ORDER — ACETAMINOPHEN 500 MG/5ML
650 LIQUID (ML) ORAL EVERY 6 HOURS
Refills: 0 | Status: DISCONTINUED | OUTPATIENT
Start: 2025-02-27 | End: 2025-02-28

## 2025-02-27 RX ORDER — LOSARTAN POTASSIUM 100 MG/1
1 TABLET, FILM COATED ORAL
Refills: 0 | DISCHARGE

## 2025-02-27 RX ORDER — TAMSULOSIN HYDROCHLORIDE 0.4 MG/1
0.4 CAPSULE ORAL AT BEDTIME
Refills: 0 | Status: DISCONTINUED | OUTPATIENT
Start: 2025-02-27 | End: 2025-02-28

## 2025-02-27 RX ORDER — HYDROMORPHONE/SOD CHLOR,ISO/PF 2 MG/10 ML
0.5 SYRINGE (ML) INJECTION
Refills: 0 | Status: DISCONTINUED | OUTPATIENT
Start: 2025-02-27 | End: 2025-02-27

## 2025-02-27 RX ORDER — SODIUM CHLORIDE 9 G/1000ML
1000 INJECTION, SOLUTION INTRAVENOUS
Refills: 0 | Status: DISCONTINUED | OUTPATIENT
Start: 2025-02-27 | End: 2025-02-27

## 2025-02-27 RX ORDER — ONDANSETRON HCL/PF 4 MG/2 ML
4 VIAL (ML) INJECTION ONCE
Refills: 0 | Status: DISCONTINUED | OUTPATIENT
Start: 2025-02-27 | End: 2025-02-27

## 2025-02-27 RX ORDER — AMLODIPINE BESYLATE 10 MG/1
5 TABLET ORAL DAILY
Refills: 0 | Status: DISCONTINUED | OUTPATIENT
Start: 2025-02-27 | End: 2025-02-28

## 2025-02-27 RX ORDER — LOSARTAN POTASSIUM 100 MG/1
50 TABLET, FILM COATED ORAL DAILY
Refills: 0 | Status: DISCONTINUED | OUTPATIENT
Start: 2025-02-28 | End: 2025-02-28

## 2025-02-27 RX ORDER — HEPARIN SODIUM 1000 [USP'U]/ML
5000 INJECTION INTRAVENOUS; SUBCUTANEOUS EVERY 12 HOURS
Refills: 0 | Status: DISCONTINUED | OUTPATIENT
Start: 2025-02-27 | End: 2025-02-28

## 2025-02-27 RX ORDER — MELATONIN 5 MG
3 TABLET ORAL AT BEDTIME
Refills: 0 | Status: DISCONTINUED | OUTPATIENT
Start: 2025-02-27 | End: 2025-02-28

## 2025-02-27 RX ORDER — ATORVASTATIN CALCIUM 80 MG/1
20 TABLET, FILM COATED ORAL AT BEDTIME
Refills: 0 | Status: DISCONTINUED | OUTPATIENT
Start: 2025-02-27 | End: 2025-02-28

## 2025-02-27 RX ORDER — MAGNESIUM, ALUMINUM HYDROXIDE 200-200 MG
30 TABLET,CHEWABLE ORAL EVERY 4 HOURS
Refills: 0 | Status: DISCONTINUED | OUTPATIENT
Start: 2025-02-27 | End: 2025-02-28

## 2025-02-27 RX ADMIN — Medication 4 MILLIGRAM(S): at 16:53

## 2025-02-27 RX ADMIN — Medication 0.5 MILLIGRAM(S): at 10:08

## 2025-02-27 RX ADMIN — HEPARIN SODIUM 5000 UNIT(S): 1000 INJECTION INTRAVENOUS; SUBCUTANEOUS at 17:15

## 2025-02-27 RX ADMIN — AMLODIPINE BESYLATE 5 MILLIGRAM(S): 10 TABLET ORAL at 19:43

## 2025-02-27 RX ADMIN — Medication 0.5 MILLIGRAM(S): at 10:30

## 2025-02-27 RX ADMIN — Medication 4 MILLIGRAM(S): at 23:32

## 2025-02-27 RX ADMIN — TAMSULOSIN HYDROCHLORIDE 0.4 MILLIGRAM(S): 0.4 CAPSULE ORAL at 21:50

## 2025-02-27 RX ADMIN — ATORVASTATIN CALCIUM 20 MILLIGRAM(S): 80 TABLET, FILM COATED ORAL at 21:50

## 2025-02-27 NOTE — H&P ADULT - ATTENDING COMMENTS
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 Pt was here today for EBUS as doing malignancy vs sarcoidosis work up. Post op pt had a PTX. Chest tube placed. Pt will be admitted to medicine for monitoring    [] Pneumothorax  s/p EBUS today   - Post op had PTX on imaging   - Pulm placed chest tube, repeat imaging my reading improving , pending reports   - Admitted for monitoring and pain control, plan for removal tomorrow   f/u Pulm reccs   monitor hemodynamics / o2 status and resp status   monitor chest tube/device, repeat CXR in am   - CXR in AM   - Pain control, monitor bm   - Pulm following   as per pulm cc /  DVT ppx today, plan to restart Eliquis after removal of tube tomorrow       #DVT/PE   - hold Eliquis ( last dose on Sunday as per the patient )   - Restart tomorrow after removal     [] HTN / HLD/ BPH  - C/w home medications     DVT ppx/GI ppx My note supersedes all residents notes that I sign, My correction for their notes are in my notes   Pt seen and examined in PACU , wife at bedside , Pt denies any sob, some pain at the site of the tube   On exam  General: awake, alert, NAD, chronic ill appearance  Lungs:  clear to ausculations b/l, normal resp effort, R Chest tube to suction   Heart: regular ryhthm   Abdomen: soft, non tender non distended  Ext: no edema, can move all  his extremities       75yo M w/a PMH of HTN, HLD, BPH, 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 Pt was here today for EBUS as doing malignancy vs sarcoidosis work up. Post op pt had a PTX. Chest tube placed. Pt will be admitted to medicine for monitoring    [] Pneumothorax  s/p EBUS today   [] Pulm nodule   - Post op had PTX on imaging   - Pulm placed chest tube, repeat imaging my reading improving , pending reports   - Admitted for monitoring and pain control, plan for removal tomorrow   f/u Pulm reccs   f/u pathology result   monitor hemodynamics / o2 status and resp status   monitor chest tube/device, repeat CXR in am   labs stable   - Pain control, monitor bm   - Pulm following   as per pulm cc /  DVT ppx today, plan to restart Eliquis after removal of tube tomorrow       #DVT/PE   - hold Eliquis ( last dose on Sunday as per the patient )   - Restart tomorrow after removal     [] HTN / HLD/ BPH  - C/w home medications     DVT ppx/GI ppx

## 2025-02-27 NOTE — H&P ADULT - ASSESSMENT
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 Pt was here today for EBUS as doing malignancy vs sarcoidosis work up. Post op pt had a PTX. Chest tube placed. Pt will be admitted to medicine for monitoring    #Pneumothorax   - Had EBUS today   - Post op had PTX on imaging   - Pulm placed chest tube, repeat imaging w/ near resolution   - Admitted for monitoring and pain control, plan for removal tomorrow   Plan   - Monitor vitals and O2 requirements   - CXR in AM   - Pain control morphine 4 q4 prn   - Pulm following   - DVT ppx today, plan to restart Eliquis after removal of tube tomorrow       #DVT/PE   - hold Eliquis   - Restart tomorrow after removal     #HTN   - C/w home medications     #HLD   - C/w home medications     BPH   - c/w home medications     #HANDOFF  #HCM  - DVT Prophylaxis: heparin   Injectable  - GI Prophylaxis: NI   - Diet: Diet, DASH/TLC  - Activity: Bedrest  - Code Status: Full   - Dispo: Pain control, monitor O2 requirements     Rogelio Andrew   PGY3, Internal Medicine   [facility]

## 2025-02-27 NOTE — ASU DISCHARGE PLAN (ADULT/PEDIATRIC) - FINANCIAL ASSISTANCE
[facility] provides services at a reduced cost to those who are determined to be eligible through [facility]’s financial assistance program. Information regarding [facility]’s financial assistance program can be found by going to [email or web address] or by calling [phone].

## 2025-02-27 NOTE — ASU PREOP CHECKLIST - IDENTIFICATION BAND VERIFIED
Dr. Collins recommends: (Fetal Hepatic Calcification)  28w4d       Met with Genetic Counselor- Normal cystic fibrosis carrier screening        Infectious studies negative from [facility]   Quad screen negative from [facility]     Growth ultrasounds every 3-4 weeks (next due 12/18/18)     Continue routine pregnancy care with Dr. Rodriguez      It was our pleasure serving you today. We thank you for choosing [facility] as your health care provider.  Because we value your input, you may receive a survey in the mail. Our priority is to provide you with excellent care and services so please take the time to complete the survey and return it in the mail. Please be aware that you do not have to answer survey questions that did not apply to your office visit. Your answers will help us change and grow to provide exceptional service. Thank you in advance for your assistance in helping [facility] build a stronger care team.   done

## 2025-02-27 NOTE — H&P PST ADULT - NSICDXPROCEDURE_GEN_ALL_CORE_FT
PROCEDURES:  Robot-assisted bronchoscopy 27-Feb-2025 07:10:58  Thai Carranza  EBUS, with fine needle aspiration 27-Feb-2025 07:11:05  Thai Carranza

## 2025-02-27 NOTE — H&P ADULT - HISTORY OF PRESENT ILLNESS
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 Pt was here today for EBUS as doing malignancy vs sarcoidosis work up. Post op pt had a PTX. Chest tube placed. Pt will be admitted to medicine for monitoring. At this time only complaint is pain at side of chest tube. Denies any fevers, chills, n/v, d/c abdominal pain.     Pt hemodynamically stable.   Imaging: CXR per my read shows near resolution fo PTX s/p chest tube.     Admitted to medicine

## 2025-02-27 NOTE — PROCEDURE NOTE - NSBRONCHPROCDETAILS_GEN_A_CORE_FT
The procedure, indications, preparation and potential complications were explained to the patient, who indicated understanding and signed the corresponding consent forms. General sedation was administered by the anesthesiologist.  Endotracheal intubation was preformed. The procedure was done for diagnostic purposes. A robotic assisted platform (RAP) was used for the procedure. Patient was intubated and the robotic flexible bronchoscope was introduced through the ET tube and advanced under direct visualization until the tracheobronchial tree was reached.     Examination of the trachea, mainstem rell, right and left mainstem bronchus was performed and found to be within NORMAL LIMITS. All region were mapped for navigation using the RAP bronchoscope.  Navigational bronchoscopy was successfully used to locate the Right ML lesion.   Adequate radial signal obtained on endobronchial US   Fluoroscopy was used throughout  Using 21 G FNA needles; multiple FNA samples were obtained   Using radial forceps multiple biopsies were obtained  BAL was preformed at the site of biospy  All samples sent for cytopathology or pathology    RAP flexible bronch was removed and an EBUS bronchoscope was used to locate lymph nodes at station 7 and 4L. Multiple FNA biopsies were obtained and sent for cytopathology.  The bronchoscope was used to assess for bleeding. No active bleeding and no lesions noted.     The bronchoscope was advanced into the right lung, where examination of the right upper middle and lower lobe was preformed without endobronchial lesion identified.     The bronchoscope was advanced into the left lung, where examination of the left upper and lower lobe was preformed without endobronchial lesion identified.    The bronchoscope was then withdrawn from the ET tube. The patient tolerated the procedure well. The procedure was not difficult.

## 2025-02-27 NOTE — PROCEDURE NOTE - SUPERVISORY STATEMENT
Robotic bronchoscope with bx of right lung nodules; EBUS FNA done as well. no events during; pneumothorax noted on post op chest xray
Right 10 Fr pigtail placed for iatrogenic pneumothorax; no issues

## 2025-02-27 NOTE — H&P ADULT - PATIENT'S PREFERRED PRONOUN
Detail Level: Detailed Him/He Quality 130: Documentation Of Current Medications In The Medical Record: Current Medications Documented Quality 431: Preventive Care And Screening: Unhealthy Alcohol Use - Screening: Patient screened for unhealthy alcohol use using a single question and scores less than 2 times per year Quality 226: Preventive Care And Screening: Tobacco Use: Screening And Cessation Intervention: Patient screened for tobacco use and is an ex/non-smoker

## 2025-02-28 ENCOUNTER — TRANSCRIPTION ENCOUNTER (OUTPATIENT)
Age: 75
End: 2025-02-28

## 2025-02-28 VITALS
HEART RATE: 71 BPM | DIASTOLIC BLOOD PRESSURE: 80 MMHG | TEMPERATURE: 99 F | SYSTOLIC BLOOD PRESSURE: 133 MMHG | RESPIRATION RATE: 18 BRPM | OXYGEN SATURATION: 97 %

## 2025-02-28 LAB
ALBUMIN SERPL ELPH-MCNC: 4.5 G/DL — SIGNIFICANT CHANGE UP (ref 3.5–5.2)
ALP SERPL-CCNC: 49 U/L — SIGNIFICANT CHANGE UP (ref 30–115)
ALT FLD-CCNC: 19 U/L — SIGNIFICANT CHANGE UP (ref 0–41)
ANION GAP SERPL CALC-SCNC: 13 MMOL/L — SIGNIFICANT CHANGE UP (ref 7–14)
AST SERPL-CCNC: 18 U/L — SIGNIFICANT CHANGE UP (ref 0–41)
BASOPHILS # BLD AUTO: 0.01 K/UL — SIGNIFICANT CHANGE UP (ref 0–0.2)
BASOPHILS NFR BLD AUTO: 0.2 % — SIGNIFICANT CHANGE UP (ref 0–1)
BILIRUB SERPL-MCNC: 1.3 MG/DL — HIGH (ref 0.2–1.2)
BUN SERPL-MCNC: 22 MG/DL — HIGH (ref 10–20)
CALCIUM SERPL-MCNC: 9.7 MG/DL — SIGNIFICANT CHANGE UP (ref 8.4–10.5)
CHLORIDE SERPL-SCNC: 101 MMOL/L — SIGNIFICANT CHANGE UP (ref 98–110)
CO2 SERPL-SCNC: 22 MMOL/L — SIGNIFICANT CHANGE UP (ref 17–32)
CREAT SERPL-MCNC: 1.3 MG/DL — SIGNIFICANT CHANGE UP (ref 0.7–1.5)
EGFR: 58 ML/MIN/1.73M2 — LOW
EOSINOPHIL # BLD AUTO: 0.02 K/UL — SIGNIFICANT CHANGE UP (ref 0–0.7)
EOSINOPHIL NFR BLD AUTO: 0.3 % — SIGNIFICANT CHANGE UP (ref 0–8)
GLUCOSE SERPL-MCNC: 94 MG/DL — SIGNIFICANT CHANGE UP (ref 70–99)
HCT VFR BLD CALC: 38.4 % — LOW (ref 42–52)
HGB BLD-MCNC: 13.5 G/DL — LOW (ref 14–18)
IMM GRANULOCYTES NFR BLD AUTO: 0.3 % — SIGNIFICANT CHANGE UP (ref 0.1–0.3)
LYMPHOCYTES # BLD AUTO: 1.01 K/UL — LOW (ref 1.2–3.4)
LYMPHOCYTES # BLD AUTO: 15.3 % — LOW (ref 20.5–51.1)
MAGNESIUM SERPL-MCNC: 1.8 MG/DL — SIGNIFICANT CHANGE UP (ref 1.8–2.4)
MCHC RBC-ENTMCNC: 31.1 PG — HIGH (ref 27–31)
MCHC RBC-ENTMCNC: 35.2 G/DL — SIGNIFICANT CHANGE UP (ref 32–37)
MCV RBC AUTO: 88.5 FL — SIGNIFICANT CHANGE UP (ref 80–94)
MONOCYTES # BLD AUTO: 0.52 K/UL — SIGNIFICANT CHANGE UP (ref 0.1–0.6)
MONOCYTES NFR BLD AUTO: 7.9 % — SIGNIFICANT CHANGE UP (ref 1.7–9.3)
NEUTROPHILS # BLD AUTO: 5.03 K/UL — SIGNIFICANT CHANGE UP (ref 1.4–6.5)
NEUTROPHILS NFR BLD AUTO: 76 % — HIGH (ref 42.2–75.2)
NRBC BLD AUTO-RTO: 0 /100 WBCS — SIGNIFICANT CHANGE UP (ref 0–0)
PLATELET # BLD AUTO: 179 K/UL — SIGNIFICANT CHANGE UP (ref 130–400)
PMV BLD: 10.2 FL — SIGNIFICANT CHANGE UP (ref 7.4–10.4)
POTASSIUM SERPL-MCNC: 4.1 MMOL/L — SIGNIFICANT CHANGE UP (ref 3.5–5)
POTASSIUM SERPL-SCNC: 4.1 MMOL/L — SIGNIFICANT CHANGE UP (ref 3.5–5)
PROT SERPL-MCNC: 6.2 G/DL — SIGNIFICANT CHANGE UP (ref 6–8)
RBC # BLD: 4.34 M/UL — LOW (ref 4.7–6.1)
RBC # FLD: 12.7 % — SIGNIFICANT CHANGE UP (ref 11.5–14.5)
SODIUM SERPL-SCNC: 136 MMOL/L — SIGNIFICANT CHANGE UP (ref 135–146)
WBC # BLD: 6.61 K/UL — SIGNIFICANT CHANGE UP (ref 4.8–10.8)
WBC # FLD AUTO: 6.61 K/UL — SIGNIFICANT CHANGE UP (ref 4.8–10.8)

## 2025-02-28 PROCEDURE — 99232 SBSQ HOSP IP/OBS MODERATE 35: CPT

## 2025-02-28 PROCEDURE — 99239 HOSP IP/OBS DSCHRG MGMT >30: CPT

## 2025-02-28 PROCEDURE — 71045 X-RAY EXAM CHEST 1 VIEW: CPT | Mod: 26

## 2025-02-28 PROCEDURE — 71045 X-RAY EXAM CHEST 1 VIEW: CPT | Mod: 26,77

## 2025-02-28 RX ORDER — APIXABAN 2.5 MG/1
1 TABLET, FILM COATED ORAL
Refills: 0 | DISCHARGE

## 2025-02-28 RX ORDER — APIXABAN 2.5 MG/1
1 TABLET, FILM COATED ORAL
Qty: 60 | Refills: 0
Start: 2025-02-28 | End: 2025-03-29

## 2025-02-28 RX ADMIN — LOSARTAN POTASSIUM 50 MILLIGRAM(S): 100 TABLET, FILM COATED ORAL at 05:47

## 2025-02-28 RX ADMIN — HEPARIN SODIUM 5000 UNIT(S): 1000 INJECTION INTRAVENOUS; SUBCUTANEOUS at 05:47

## 2025-02-28 RX ADMIN — Medication 4 MILLIGRAM(S): at 00:04

## 2025-02-28 RX ADMIN — Medication 4 MILLIGRAM(S): at 13:57

## 2025-02-28 RX ADMIN — Medication 4 MILLIGRAM(S): at 13:06

## 2025-02-28 RX ADMIN — AMLODIPINE BESYLATE 5 MILLIGRAM(S): 10 TABLET ORAL at 05:46

## 2025-02-28 NOTE — DISCHARGE NOTE PROVIDER - HOSPITAL COURSE
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 Pt was here today for EBUS as doing malignancy vs sarcoidosis work up. Post op pt had a PTX. Chest tube placed. Pt will be admitted to medicine for monitoring. At this time only complaint is pain at side of chest tube. Denies any fevers, chills, n/v, d/c abdominal pain.     Pt hemodynamically stable.   Imaging: CXR per my read shows near resolution fo PTX s/p chest tube.   Chest tube was successfully removed and xray showed resolution of pneumothorax. Pulm cleared for dc    #Pneumothorax   - Had EBUS today   - Post op had PTX on imaging   - Pulm placed chest tube, repeat imaging w/ near resolution   - Admitted for monitoring and pain control, plan for removal today   - Monitor vitals and O2 requirements   - Pain control morphine 4 q4 prn   - Pulm following     #DVT/PE   - resume Eliquis after removal of chest tube    #HTN   - C/w home medications     #HLD   - C/w home medications     BPH   - c/w home medications   Discussion of discharge plan of care, including discharge diagnoses, medication reconciliation, and follow-ups was discussed with Dr. Wayne on 2/28/25 and discharge was approved.

## 2025-02-28 NOTE — DISCHARGE NOTE PROVIDER - PROVIDER TOKENS
PROVIDER:[TOKEN:[[id number]],FOLLOWUP:[1 week],ESTABLISHEDPATIENT:[T]] PROVIDER:[TOKEN:[[id number]],FOLLOWUP:[1 week],ESTABLISHEDPATIENT:[T]]

## 2025-02-28 NOTE — DISCHARGE NOTE PROVIDER - NSDCCPCAREPLAN_GEN_ALL_CORE_FT
PRINCIPAL DISCHARGE DIAGNOSIS  Diagnosis: Pneumothorax, right  Assessment and Plan of Treatment: You can in for EBUS and was found to have pneumothorax post-procedure. You had a chest tube placed with resolution of the pneumothorax. The chest tube was removed. You may resume eliquis.  Please follow up with pulmonogist a week after discharge

## 2025-02-28 NOTE — DISCHARGE NOTE NURSING/CASE MANAGEMENT/SOCIAL WORK - PATIENT PORTAL LINK FT
You can access the FollowMyHealth Patient Portal offered by [facility] by registering at the following website: [email or web address]. By joining [facility]’s FollowMyHealth portal, you will also be able to view your health information using other applications (apps) compatible with our system.

## 2025-02-28 NOTE — DISCHARGE NOTE PROVIDER - NSDCMRMEDTOKEN_GEN_ALL_CORE_FT
cholecalciferol:   Eliquis 5 mg oral tablet: 1 tab(s) orally 2 times a day  Flomax 0.4 mg oral capsule: 1 cap(s) orally once a day  irbesartan 150 mg oral tablet: 1 tab(s) orally once a day  Lipitor 20 mg oral tablet: 1 tab(s) orally once a day  Norvasc 5 mg oral tablet: 1 tab(s) orally once a day

## 2025-02-28 NOTE — CONSULT NOTE ADULT - SUBJECTIVE AND OBJECTIVE BOX
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 Pt was here today for EBUS as doing malignancy vs sarcoidosis work up. Post op pt had a PTX. Chest tube placed. Pt will be admitted to medicine for monitoring. At this time only complaint is pain at side of chest tube. Denies any fevers, chills, n/v, d/c abdominal pain.     Pt hemodynamically stable.   Imaging: CXR per my read shows near resolution fo PTX s/p chest tube.     Admitted to medicine  (27 Feb 2025 16:22)      PAST MEDICAL & SURGICAL HISTORY:  High cholesterol      Hypertension      COVID-19      H/O left inguinal hernia repair          SOCIAL HX:   Smoking         smoked for 30 years, stopped many year ago                ETOH                            Other    FAMILY HISTORY:  .  No cardiovascular or pulmonary family history     REVIEW OF SYSTEMS:    All ROS are negative exept per HPI       Allergies    No Known Allergies    Intolerances          PHYSICAL EXAM  Vital Signs Last 24 Hrs  T(C): 36.6 (28 Feb 2025 04:09), Max: 36.7 (27 Feb 2025 17:44)  T(F): 97.8 (28 Feb 2025 04:09), Max: 98 (27 Feb 2025 17:44)  HR: 60 (28 Feb 2025 07:51) (56 - 97)  BP: 110/64 (28 Feb 2025 04:09) (110/64 - 156/74)  BP(mean): 96 (27 Feb 2025 17:44) (96 - 107)  RR: 18 (28 Feb 2025 07:51) (16 - 18)  SpO2: 96% (28 Feb 2025 07:51) (94% - 100%)    Parameters below as of 28 Feb 2025 07:51  Patient On (Oxygen Delivery Method): room air        CONSTITUTIONAL:  Well nourished.  NAD    CARDIAC:   Normal rate,   regular rhythm.    no edema    RESPIRATORY:   No wheezing   Normal chest expansion    GASTROINTESTINAL:  Abdomen soft, non-tender,   No guarding,     MUSCULOSKELETAL:   Range of motion is not limited,  No clubbing, cyanosis    NEUROLOGICAL:   Alert and oriented   No motor deficits.      LABS:                          15.3   6.19  )-----------( 178      ( 27 Feb 2025 17:00 )             44.1                                               02-27    136  |  103  |  19  ----------------------------<  112[H]  5.0   |  21  |  1.3    Ca    9.3      27 Feb 2025 17:00  Mg     2.0     02-27    TPro  6.8  /  Alb  5.0  /  TBili  1.1  /  DBili  x   /  AST  21  /  ALT  25  /  AlkPhos  60  02-27                                             Urinalysis Basic - ( 27 Feb 2025 17:00 )    Color: x / Appearance: x / SG: x / pH: x  Gluc: 112 mg/dL / Ketone: x  / Bili: x / Urobili: x   Blood: x / Protein: x / Nitrite: x   Leuk Esterase: x / RBC: x / WBC x   Sq Epi: x / Non Sq Epi: x / Bacteria: x                                                LIVER FUNCTIONS - ( 27 Feb 2025 17:00 )  Alb: 5.0 g/dL / Pro: 6.8 g/dL / ALK PHOS: 60 U/L / ALT: 25 U/L / AST: 21 U/L / GGT: x                                                                                                MEDICATIONS  (STANDING):  amLODIPine   Tablet 5 milliGRAM(s) Oral daily  atorvastatin 20 milliGRAM(s) Oral at bedtime  heparin   Injectable 5000 Unit(s) SubCutaneous every 12 hours  losartan 50 milliGRAM(s) Oral daily  tamsulosin 0.4 milliGRAM(s) Oral at bedtime    MEDICATIONS  (PRN):  acetaminophen     Tablet .. 650 milliGRAM(s) Oral every 6 hours PRN Temp greater or equal to 38C (100.4F), Mild Pain (1 - 3)  aluminum hydroxide/magnesium hydroxide/simethicone Suspension 30 milliLiter(s) Oral every 4 hours PRN Dyspepsia  melatonin 3 milliGRAM(s) Oral at bedtime PRN Insomnia  morphine  - Injectable 4 milliGRAM(s) IV Push every 4 hours PRN Moderate Pain (4 - 6)      X-Rays reviewed:    CXR interpreted by me:

## 2025-02-28 NOTE — DISCHARGE NOTE PROVIDER - CARE PROVIDER_API CALL
Musa Arana  Pulmonary Disease  [address] [place] [id number]  Phone: [phone]  Fax: [phone]  Established Patient  Follow Up Time: 1 week   Taran Bar  Critical Care Medicine  [address] [place] [id number]  Phone: [phone]  Fax: [phone]  Established Patient  Follow Up Time: 1 week

## 2025-02-28 NOTE — CONSULT NOTE ADULT - ASSESSMENT
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 Bar

## 2025-02-28 NOTE — DISCHARGE NOTE PROVIDER - CARE PROVIDERS DIRECT ADDRESSES
,[email or web address]

## 2025-02-28 NOTE — DISCHARGE NOTE PROVIDER - NSDCFUSCHEDAPPT_GEN_ALL_CORE_FT
[facility] Physician [facility]  CARDIOLOGY [address] [place] [address]  Scheduled Appointment: 03/04/2025

## 2025-02-28 NOTE — DISCHARGE NOTE NURSING/CASE MANAGEMENT/SOCIAL WORK - NSDCPEFALRISK_GEN_ALL_CORE
For information on Fall & Injury Prevention, visit: [email or web address] OR  [email or web address] OR  https://www.cdc.gov/steadi/patient.html

## 2025-02-28 NOTE — PATIENT PROFILE ADULT - FALL HARM RISK - RISK INTERVENTIONS
Assistance with ambulation/Communicate Fall Risk and Risk Factors to all staff, patient, and family/Reinforce activity limits and safety measures with patient and family/Visual Cue: Yellow wristband/Bed in lowest position, wheels locked, appropriate side rails in place/Call bell, personal items and telephone in reach/Instruct patient to call for assistance before getting out of bed or chair/Non-slip footwear when patient is out of bed/[place] to call system/Physically safe environment - no spills, clutter or unnecessary equipment/Purposeful Proactive Rounding/Room/bathroom lighting operational, light cord in reach

## 2025-02-28 NOTE — DISCHARGE NOTE NURSING/CASE MANAGEMENT/SOCIAL WORK - FINANCIAL ASSISTANCE
[facility] provides services at a reduced cost to those who are determined to be eligible through [facility]’s financial assistance program. Information regarding [facility]’s financial assistance program can be found by going to [email or web address] or by calling [phone].

## 2025-03-03 LAB — SURGICAL PATHOLOGY STUDY: SIGNIFICANT CHANGE UP

## 2025-03-04 ENCOUNTER — TRANSCRIPTION ENCOUNTER (OUTPATIENT)
Age: 75
End: 2025-03-04

## 2025-03-04 ENCOUNTER — APPOINTMENT (OUTPATIENT)
Dept: CARDIOLOGY | Facility: CLINIC | Age: 75
End: 2025-03-04

## 2025-03-05 DIAGNOSIS — Z87.891 PERSONAL HISTORY OF NICOTINE DEPENDENCE: ICD-10-CM

## 2025-03-05 DIAGNOSIS — I10 ESSENTIAL (PRIMARY) HYPERTENSION: ICD-10-CM

## 2025-03-05 DIAGNOSIS — Z79.01 LONG TERM (CURRENT) USE OF ANTICOAGULANTS: ICD-10-CM

## 2025-03-05 DIAGNOSIS — J95.811 POSTPROCEDURAL PNEUMOTHORAX: ICD-10-CM

## 2025-03-05 DIAGNOSIS — Z86.16 PERSONAL HISTORY OF COVID-19: ICD-10-CM

## 2025-03-05 DIAGNOSIS — D86.0 SARCOIDOSIS OF LUNG: ICD-10-CM

## 2025-03-05 DIAGNOSIS — Z86.718 PERSONAL HISTORY OF OTHER VENOUS THROMBOSIS AND EMBOLISM: ICD-10-CM

## 2025-03-05 DIAGNOSIS — E78.5 HYPERLIPIDEMIA, UNSPECIFIED: ICD-10-CM

## 2025-03-05 DIAGNOSIS — Z86.711 PERSONAL HISTORY OF PULMONARY EMBOLISM: ICD-10-CM

## 2025-03-05 DIAGNOSIS — N40.0 BENIGN PROSTATIC HYPERPLASIA WITHOUT LOWER URINARY TRACT SYMPTOMS: ICD-10-CM

## 2025-03-05 DIAGNOSIS — R59.0 LOCALIZED ENLARGED LYMPH NODES: ICD-10-CM

## 2025-03-06 LAB
NON-GYNECOLOGICAL CYTOLOGY STUDY: SIGNIFICANT CHANGE UP

## 2025-03-07 ENCOUNTER — NON-APPOINTMENT (OUTPATIENT)
Age: 75
End: 2025-03-07

## 2025-03-11 ENCOUNTER — TRANSCRIPTION ENCOUNTER (OUTPATIENT)
Age: 75
End: 2025-03-11

## 2025-03-17 NOTE — DISCHARGE NOTE NURSING/CASE MANAGEMENT/SOCIAL WORK - NSDCPEELIQUISFU_GEN_ALL_CORE
Dutasteride Pregnancy And Lactation Text: This medication is absolutely contraindicated in women, especially during pregnancy and breast feeding. Feminization of male fetuses is possible if taking while pregnant. Rhofade Counseling: Rhofade is a topical medication which can decrease superficial blood flow where applied. Side effects are uncommon and include stinging, redness and allergic reactions. Colchicine Counseling:  Patient counseled regarding adverse effects including but not limited to stomach upset (nausea, vomiting, stomach pain, or diarrhea).  Patient instructed to limit alcohol consumption while taking this medication.  Colchicine may reduce blood counts especially with prolonged use.  The patient understands that monitoring of kidney function and blood counts may be required, especially at baseline. The patient verbalized understanding of the proper use and possible adverse effects of colchicine.  All of the patient's questions and concerns were addressed. Mirvaso Pregnancy And Lactation Text: This medication has not been assigned a Pregnancy Risk Category. It is unknown if the medication is excreted in breast milk. Griseofulvin Pregnancy And Lactation Text: This medication is Pregnancy Category X and is known to cause serious birth defects. It is unknown if this medication is excreted in breast milk but breast feeding should be avoided. Propranolol Counseling:  I discussed with the patient the risks of propranolol including but not limited to low heart rate, low blood pressure, low blood sugar, restlessness and increased cold sensitivity. They should call the office if they experience any of these side effects. Zoryve Counseling:  I discussed with the patient that Zoryve is not for use in the eyes, mouth or vagina. The most commonly reported side effects include diarrhea, headache, insomnia, application site pain, upper respiratory tract infections, and urinary tract infections.  All of the patient's questions and concerns were addressed. Cyclosporine Pregnancy And Lactation Text: This medication is Pregnancy Category C and it isn't know if it is safe during pregnancy. This medication is excreted in breast milk. Hydroxyzine Counseling: Patient advised that the medication is sedating and not to drive a car after taking this medication.  Patient informed of potential adverse effects including but not limited to dry mouth, urinary retention, and blurry vision.  The patient verbalized understanding of the proper use and possible adverse effects of hydroxyzine.  All of the patient's questions and concerns were addressed. Litfulo Pregnancy And Lactation Text: Based on animal studies, Lifulo may cause embryo-fetal harm when administered to pregnant women.  The medication should not be used in pregnancy.  Breastfeeding is not recommended during treatment. Bimzelx Counseling:  I discussed with the patient the risks of Bimzelx including but not limited to depression, immunosuppression, allergic reactions and infections.  The patient understands that monitoring is required including a PPD at baseline and must alert us or the primary physician if symptoms of infection or other concerning signs are noted. Azithromycin Pregnancy And Lactation Text: This medication is considered safe during pregnancy and is also secreted in breast milk. Hydroxychloroquine Pregnancy And Lactation Text: This medication has been shown to cause fetal harm but it isn't assigned a Pregnancy Risk Category. There are small amounts excreted in breast milk. Erythromycin Counseling:  I discussed with the patient the risks of erythromycin including but not limited to GI upset, allergic reaction, drug rash, diarrhea, increase in liver enzymes, and yeast infections. Tazorac Pregnancy And Lactation Text: This medication is not safe during pregnancy. It is unknown if this medication is excreted in breast milk. Enbrel Pregnancy And Lactation Text: This medication is Pregnancy Category B and is considered safe during pregnancy. It is unknown if this medication is excreted in breast milk. Valtrex Pregnancy And Lactation Text: this medication is Pregnancy Category B and is considered safe during pregnancy. This medication is not directly found in breast milk but it's metabolite acyclovir is present. Rifampin Pregnancy And Lactation Text: This medication is Pregnancy Category C and it isn't know if it is safe during pregnancy. It is also excreted in breast milk and should not be used if you are breast feeding. Topical Sulfur Applications Counseling: Topical Sulfur Counseling: Patient counseled that this medication may cause skin irritation or allergic reactions.  In the event of skin irritation, the patient was advised to reduce the amount of the drug applied or use it less frequently.   The patient verbalized understanding of the proper use and possible adverse effects of topical sulfur application.  All of the patient's questions and concerns were addressed. Rituxan Counseling:  I discussed with the patient the risks of Rituxan infusions. Side effects can include infusion reactions, severe drug rashes including mucocutaneous reactions, reactivation of latent hepatitis and other infections and rarely progressive multifocal leukoencephalopathy.  All of the patient's questions and concerns were addressed. Spironolactone Pregnancy And Lactation Text: This medication can cause feminization of the male fetus and should be avoided during pregnancy. The active metabolite is also found in breast milk. Calcipotriene Counseling:  I discussed with the patient the risks of calcipotriene including but not limited to erythema, scaling, itching, and irritation. Go for blood tests as directed. Your doctor will do lab tests at regular visits to monitor the effects of this medicine. Please follow up with your doctor and keep your health care provider appointments. Olanzapine Counseling- I discussed with the patient the common side effects of olanzapine including but are not limited to: lack of energy, dry mouth, increased appetite, sleepiness, tremor, constipation, dizziness, changes in behavior, or restlessness.  Explained that teenagers are more likely to experience headaches, abdominal pain, pain in the arms or legs, tiredness, and sleepiness.  Serious side effects include but are not limited: increased risk of death in elderly patients who are confused, have memory loss, or dementia-related psychosis; hyperglycemia; increased cholesterol and triglycerides; and weight gain. Zoryve Pregnancy And Lactation Text: It is unknown if this medication can cause problems during pregnancy and breastfeeding. Calcipotriene Pregnancy And Lactation Text: The use of this medication during pregnancy or lactation is not recommended as there is insufficient data. Ketoconazole Counseling:   Patient counseled regarding improving absorption with orange juice.  Adverse effects include but are not limited to breast enlargement, headache, diarrhea, nausea, upset stomach, liver function test abnormalities, taste disturbance, and stomach pain.  There is a rare possibility of liver failure that can occur when taking ketoconazole. The patient understands that monitoring of LFTs may be required, especially at baseline. The patient verbalized understanding of the proper use and possible adverse effects of ketoconazole.  All of the patient's questions and concerns were addressed. Erivedge Pregnancy And Lactation Text: This medication is Pregnancy Category X and is absolutely contraindicated during pregnancy. It is unknown if it is excreted in breast milk. Eucrisa Pregnancy And Lactation Text: This medication has not been assigned a Pregnancy Risk Category but animal studies failed to show danger with the topical medication. It is unknown if the medication is excreted in breast milk. Opzelura Counseling:  I discussed with the patient the risks of Opzelura including but not limited to nasopharngitis, bronchitis, ear infection, eosinophila, hives, diarrhea, folliculitis, tonsillitis, and rhinorrhea.  Taken orally, this medication has been linked to serious infections; higher rate of mortality; malignancy and lymphoproliferative disorders; major adverse cardiovascular events; thrombosis; thrombocytopenia, anemia, and neutropenia; and lipid elevations. Erythromycin Pregnancy And Lactation Text: This medication is Pregnancy Category B and is considered safe during pregnancy. It is also excreted in breast milk. Propranolol Pregnancy And Lactation Text: This medication is Pregnancy Category C and it isn't known if it is safe during pregnancy. It is excreted in breast milk. Hydroxyzine Pregnancy And Lactation Text: This medication is not safe during pregnancy and should not be taken. It is also excreted in breast milk and breast feeding isn't recommended. Colchicine Pregnancy And Lactation Text: This medication is Pregnancy Category C and isn't considered safe during pregnancy. It is excreted in breast milk. Aklief counseling:  Patient advised to apply a pea-sized amount only at bedtime and wait 30 minutes after washing their face before applying.  If too drying, patient may add a non-comedogenic moisturizer.  The most commonly reported side effects including irritation, redness, scaling, dryness, stinging, burning, itching, and increased risk of sunburn.  The patient verbalized understanding of the proper use and possible adverse effects of retinoids.  All of the patient's questions and concerns were addressed. Sarecycline Counseling: Patient advised regarding possible photosensitivity and discoloration of the teeth, skin, lips, tongue and gums.  Patient instructed to avoid sunlight, if possible.  When exposed to sunlight, patients should wear protective clothing, sunglasses, and sunscreen.  The patient was instructed to call the office immediately if the following severe adverse effects occur:  hearing changes, easy bruising/bleeding, severe headache, or vision changes.  The patient verbalized understanding of the proper use and possible adverse effects of sarecycline.  All of the patient's questions and concerns were addressed. Bactrim Counseling:  I discussed with the patient the risks of sulfa antibiotics including but not limited to GI upset, allergic reaction, drug rash, diarrhea, dizziness, photosensitivity, and yeast infections.  Rarely, more serious reactions can occur including but not limited to aplastic anemia, agranulocytosis, methemoglobinemia, blood dyscrasias, liver or kidney failure, lung infiltrates or desquamative/blistering drug rashes. Methotrexate Counseling:  Patient counseled regarding adverse effects of methotrexate including but not limited to nausea, vomiting, abnormalities in liver function tests. Patients may develop mouth sores, rash, diarrhea, and abnormalities in blood counts. The patient understands that monitoring is required including LFT's and blood counts.  There is a rare possibility of scarring of the liver and lung problems that can occur when taking methotrexate. Persistent nausea, loss of appetite, pale stools, dark urine, cough, and shortness of breath should be reported immediately. Patient advised to discontinue methotrexate treatment at least three months before attempting to become pregnant.  I discussed the need for folate supplements while taking methotrexate.  These supplements can decrease side effects during methotrexate treatment. The patient verbalized understanding of the proper use and possible adverse effects of methotrexate.  All of the patient's questions and concerns were addressed. Bimzelx Pregnancy And Lactation Text: This medication crosses the placenta and the safety is uncertain during pregnancy. It is unknown if this medication is present in breast milk. Topical Clindamycin Counseling: Patient counseled that this medication may cause skin irritation or allergic reactions.  In the event of skin irritation, the patient was advised to reduce the amount of the drug applied or use it less frequently.   The patient verbalized understanding of the proper use and possible adverse effects of clindamycin.  All of the patient's questions and concerns were addressed. Use Enhanced Medication Counseling?: No Humira Counseling:  I discussed with the patient the risks of adalimumab including but not limited to myelosuppression, immunosuppression, autoimmune hepatitis, demyelinating diseases, lymphoma, and serious infections.  The patient understands that monitoring is required including a PPD at baseline and must alert us or the primary physician if symptoms of infection or other concerning signs are noted. Cantharidin Counseling:  I discussed with the patient the risks of Cantharidin including but not limited to pain, redness, burning, itching, and blistering. Low Dose Naltrexone Counseling- I discussed with the patient the potential risks and side effects of low dose naltrexone including but not limited to: more vivid dreams, headaches, nausea, vomiting, abdominal pain, fatigue, dizziness, and anxiety. Olumiant Counseling: I discussed with the patient the risks of Olumiant therapy including but not limited to upper respiratory tract infections, shingles, cold sores, and nausea. Live vaccines should be avoided.  This medication has been linked to serious infections; higher rate of mortality; malignancy and lymphoproliferative disorders; major adverse cardiovascular events; thrombosis; gastrointestinal perforations; neutropenia; lymphopenia; anemia; liver enzyme elevations; and lipid elevations. Acitretin Counseling:  I discussed with the patient the risks of acitretin including but not limited to hair loss, dry lips/skin/eyes, liver damage, hyperlipidemia, depression/suicidal ideation, photosensitivity.  Serious rare side effects can include but are not limited to pancreatitis, pseudotumor cerebri, bony changes, clot formation/stroke/heart attack.  Patient understands that alcohol is contraindicated since it can result in liver toxicity and significantly prolong the elimination of the drug by many years. Olanzapine Pregnancy And Lactation Text: This medication is pregnancy category C.   There are no adequate and well controlled trials with olanzapine in pregnant females.  Olanzapine should be used during pregnancy only if the potential benefit justifies the potential risk to the fetus.   In a study in lactating healthy women, olanzapine was excreted in breast milk.  It is recommended that women taking olanzapine should not breast feed. Topical Sulfur Applications Pregnancy And Lactation Text: This medication is Pregnancy Category C and has an unknown safety profile during pregnancy. It is unknown if this topical medication is excreted in breast milk. Rituxan Pregnancy And Lactation Text: This medication is Pregnancy Category C and it isn't know if it is safe during pregnancy. It is unknown if this medication is excreted in breast milk but similar antibodies are known to be excreted. Cantharidin Pregnancy And Lactation Text: This medication has not been proven safe during pregnancy. It is unknown if this medication is excreted in breast milk. Aklief Pregnancy And Lactation Text: It is unknown if this medication is safe to use during pregnancy.  It is unknown if this medication is excreted in breast milk.  Breastfeeding women should use the topical cream on the smallest area of the skin for the shortest time needed while breastfeeding.  Do not apply to nipple and areola. SSKI Counseling:  I discussed with the patient the risks of SSKI including but not limited to thyroid abnormalities, metallic taste, GI upset, fever, headache, acne, arthralgias, paraesthesias, lymphadenopathy, easy bleeding, arrhythmias, and allergic reaction. Taltz Counseling: I discussed with the patient the risks of ixekizumab including but not limited to immunosuppression, serious infections, worsening of inflammatory bowel disease and drug reactions.  The patient understands that monitoring is required including a PPD at baseline and must alert us or the primary physician if symptoms of infection or other concerning signs are noted. Ketoconazole Pregnancy And Lactation Text: This medication is Pregnancy Category C and it isn't know if it is safe during pregnancy. It is also excreted in breast milk and breast feeding isn't recommended. Libtayo Counseling- I discussed with the patient the risks of Libtayo including but not limited to nausea, vomiting, diarrhea, and bone or muscle pain.  The patient verbalized understanding of the proper use and possible adverse effects of Libtayo.  All of the patient's questions and concerns were addressed. Zyclara Counseling:  I discussed with the patient the risks of imiquimod including but not limited to erythema, scaling, itching, weeping, crusting, and pain.  Patient understands that the inflammatory response to imiquimod is variable from person to person and was educated regarded proper titration schedule.  If flu-like symptoms develop, patient knows to discontinue the medication and contact us. Hydroquinone Counseling:  Patient advised that medication may result in skin irritation, lightening (hypopigmentation), dryness, and burning.  In the event of skin irritation, the patient was advised to reduce the amount of the drug applied or use it less frequently.  Rarely, spots that are treated with hydroquinone can become darker (pseudoochronosis).  Should this occur, patient instructed to stop medication and call the office. The patient verbalized understanding of the proper use and possible adverse effects of hydroquinone.  All of the patient's questions and concerns were addressed. Opzelura Pregnancy And Lactation Text: There is insufficient data to evaluate drug-associated risk for major birth defects, miscarriage, or other adverse maternal or fetal outcomes.  There is a pregnancy registry that monitors pregnancy outcomes in pregnant persons exposed to the medication during pregnancy.  It is unknown if this medication is excreted in breast milk.  Do not breastfeed during treatment and for about 4 weeks after the last dose. Bactrim Pregnancy And Lactation Text: This medication is Pregnancy Category D and is known to cause fetal risk.  It is also excreted in breast milk. Azathioprine Counseling:  I discussed with the patient the risks of azathioprine including but not limited to myelosuppression, immunosuppression, hepatotoxicity, lymphoma, and infections.  The patient understands that monitoring is required including baseline LFTs, Creatinine, possible TPMP genotyping and weekly CBCs for the first month and then every 2 weeks thereafter.  The patient verbalized understanding of the proper use and possible adverse effects of azathioprine.  All of the patient's questions and concerns were addressed. Dapsone Counseling: I discussed with the patient the risks of dapsone including but not limited to hemolytic anemia, agranulocytosis, rashes, methemoglobinemia, kidney failure, peripheral neuropathy, headaches, GI upset, and liver toxicity.  Patients who start dapsone require monitoring including baseline LFTs and weekly CBCs for the first month, then every month thereafter.  The patient verbalized understanding of the proper use and possible adverse effects of dapsone.  All of the patient's questions and concerns were addressed. Olumiant Pregnancy And Lactation Text: Based on animal studies, Olumiant may cause embryo-fetal harm when administered to pregnant women.  The medication should not be used in pregnancy.  Breastfeeding is not recommended during treatment. Acitretin Pregnancy And Lactation Text: This medication is Pregnancy Category X and should not be given to women who are pregnant or may become pregnant in the future. This medication is excreted in breast milk. Low Dose Naltrexone Pregnancy And Lactation Text: Naltrexone is pregnancy category C.  There have been no adequate and well-controlled studies in pregnant women.  It should be used in pregnancy only if the potential benefit justifies the potential risk to the fetus.   Limited data indicates that naltrexone is minimally excreted into breastmilk. Cimzia Counseling:  I discussed with the patient the risks of Cimzia including but not limited to immunosuppression, allergic reactions and infections.  The patient understands that monitoring is required including a PPD at baseline and must alert us or the primary physician if symptoms of infection or other concerning signs are noted. Solaraze Counseling:  I discussed with the patient the risks of Solaraze including but not limited to erythema, scaling, itching, weeping, crusting, and pain. Methotrexate Pregnancy And Lactation Text: This medication is Pregnancy Category X and is known to cause fetal harm. This medication is excreted in breast milk. 5-Fu Counseling: 5-Fluorouracil Counseling:  I discussed with the patient the risks of 5-fluorouracil including but not limited to erythema, scaling, itching, weeping, crusting, and pain. Metronidazole Counseling:  I discussed with the patient the risks of metronidazole including but not limited to seizures, nausea/vomiting, a metallic taste in the mouth, nausea/vomiting and severe allergy. Topical Clindamycin Pregnancy And Lactation Text: This medication is Pregnancy Category B and is considered safe during pregnancy. It is unknown if it is excreted in breast milk. Sarecycline Pregnancy And Lactation Text: This medication is Pregnancy Category D and not consider safe during pregnancy. It is also excreted in breast milk. Siliq Counseling:  I discussed with the patient the risks of Siliq including but not limited to new or worsening depression, suicidal thoughts and behavior, immunosuppression, malignancy, posterior leukoencephalopathy syndrome, and serious infections.  The patient understands that monitoring is required including a PPD at baseline and must alert us or the primary physician if symptoms of infection or other concerning signs are noted. There is also a special program designed to monitor depression which is required with Siliq. Albendazole Counseling:  I discussed with the patient the risks of albendazole including but not limited to cytopenia, kidney damage, nausea/vomiting and severe allergy.  The patient understands that this medication is being used in an off-label manner. Picato Counseling:  I discussed with the patient the risks of Picato including but not limited to erythema, scaling, itching, weeping, crusting, and pain. Oral Minoxidil Counseling- I discussed with the patient the risks of oral minoxidil including but not limited to shortness of breath, swelling of the feet or ankles, dizziness, lightheadedness, unwanted hair growth and allergic reaction.  The patient verbalized understanding of the proper use and possible adverse effects of oral minoxidil.  All of the patient's questions and concerns were addressed. Libtayo Pregnancy And Lactation Text: This medication is contraindicated in pregnancy and when breast feeding. Wartpeel Counseling:  I discussed with the patient the risks of Wartpeel including but not limited to erythema, scaling, itching, weeping, crusting, and pain. Terbinafine Counseling: Patient counseling regarding adverse effects of terbinafine including but not limited to headache, diarrhea, rash, upset stomach, liver function test abnormalities, itching, taste/smell disturbance, nausea, abdominal pain, and flatulence.  There is a rare possibility of liver failure that can occur when taking terbinafine.  The patient understands that a baseline LFT and kidney function test may be required. The patient verbalized understanding of the proper use and possible adverse effects of terbinafine.  All of the patient's questions and concerns were addressed. Azelaic Acid Counseling: Patient counseled that medicine may cause skin irritation and to avoid applying near the eyes.  In the event of skin irritation, the patient was advised to reduce the amount of the drug applied or use it less frequently.   The patient verbalized understanding of the proper use and possible adverse effects of azelaic acid.  All of the patient's questions and concerns were addressed. Dapsone Pregnancy And Lactation Text: This medication is Pregnancy Category C and is not considered safe during pregnancy or breast feeding. Azathioprine Pregnancy And Lactation Text: This medication is Pregnancy Category D and isn't considered safe during pregnancy. It is unknown if this medication is excreted in breast milk. Solaraze Pregnancy And Lactation Text: This medication is Pregnancy Category B and is considered safe. There is some data to suggest avoiding during the third trimester. It is unknown if this medication is excreted in breast milk. Sski Pregnancy And Lactation Text: This medication is Pregnancy Category D and isn't considered safe during pregnancy. It is excreted in breast milk. Hyrimoz Counseling:  I discussed with the patient the risks of adalimumab including but not limited to myelosuppression, immunosuppression, autoimmune hepatitis, demyelinating diseases, lymphoma, and serious infections.  The patient understands that monitoring is required including a PPD at baseline and must alert us or the primary physician if symptoms of infection or other concerning signs are noted. Taltz Pregnancy And Lactation Text: The risk during pregnancy and breastfeeding is uncertain with this medication. Zyclara Pregnancy And Lactation Text: This medication is Pregnancy Category C. It is unknown if this medication is excreted in breast milk. 5-Fu Pregnancy And Lactation Text: This medication is Pregnancy Category X and contraindicated in pregnancy and in women who may become pregnant. It is unknown if this medication is excreted in breast milk. Topical Ketoconazole Counseling: Patient counseled that this medication may cause skin irritation or allergic reactions.  In the event of skin irritation, the patient was advised to reduce the amount of the drug applied or use it less frequently.   The patient verbalized understanding of the proper use and possible adverse effects of ketoconazole.  All of the patient's questions and concerns were addressed. Rinvoq Counseling: I discussed with the patient the risks of Rinvoq therapy including but not limited to upper respiratory tract infections, shingles, cold sores, bronchitis, nausea, cough, fever, acne, and headache. Live vaccines should be avoided.  This medication has been linked to serious infections; higher rate of mortality; malignancy and lymphoproliferative disorders; major adverse cardiovascular events; thrombosis; thrombocytopenia, anemia, and neutropenia; lipid elevations; liver enzyme elevations; and gastrointestinal perforations. Bexarotene Counseling:  I discussed with the patient the risks of bexarotene including but not limited to hair loss, dry lips/skin/eyes, liver abnormalities, hyperlipidemia, pancreatitis, depression/suicidal ideation, photosensitivity, drug rash/allergic reactions, hypothyroidism, anemia, leukopenia, infection, cataracts, and teratogenicity.  Patient understands that they will need regular blood tests to check lipid profile, liver function tests, white blood cell count, thyroid function tests and pregnancy test if applicable. Finasteride Male Counseling: Finasteride Counseling:  I discussed with the patient the risks of use of finasteride including but not limited to decreased libido, decreased ejaculate volume, gynecomastia, and depression. Women should not handle medication.  All of the patient's questions and concerns were addressed. Metronidazole Pregnancy And Lactation Text: This medication is Pregnancy Category B and considered safe during pregnancy.  It is also excreted in breast milk. Niacinamide Counseling: I recommended taking niacin or niacinamide, also know as vitamin B3, twice daily. Recent evidence suggests that taking vitamin B3 (500 mg twice daily) can reduce the risk of actinic keratoses and non-melanoma skin cancers. Side effects of vitamin B3 include flushing and headache. Cephalexin Counseling: I counseled the patient regarding use of cephalexin as an antibiotic for prophylactic and/or therapeutic purposes. Cephalexin (commonly prescribed under brand name Keflex) is a cephalosporin antibiotic which is active against numerous classes of bacteria, including most skin bacteria. Side effects may include nausea, diarrhea, gastrointestinal upset, rash, hives, yeast infections, and in rare cases, hepatitis, kidney disease, seizures, fever, confusion, neurologic symptoms, and others. Patients with severe allergies to penicillin medications are cautioned that there is about a 10% incidence of cross-reactivity with cephalosporins. When possible, patients with penicillin allergies should use alternatives to cephalosporins for antibiotic therapy. Cimzia Pregnancy And Lactation Text: This medication crosses the placenta but can be considered safe in certain situations. Cimzia may be excreted in breast milk. Terbinafine Pregnancy And Lactation Text: This medication is Pregnancy Category B and is considered safe during pregnancy. It is also excreted in breast milk and breast feeding isn't recommended. Albendazole Pregnancy And Lactation Text: This medication is Pregnancy Category C and it isn't known if it is safe during pregnancy. It is also excreted in breast milk. Prednisone Counseling:  I discussed with the patient the risks of prolonged use of prednisone including but not limited to weight gain, insomnia, osteoporosis, mood changes, diabetes, susceptibility to infection, glaucoma and high blood pressure.  In cases where prednisone use is prolonged, patients should be monitored with blood pressure checks, serum glucose levels and an eye exam.  Additionally, the patient may need to be placed on GI prophylaxis, PCP prophylaxis, and calcium and vitamin D supplementation and/or a bisphosphonate.  The patient verbalized understanding of the proper use and the possible adverse effects of prednisone.  All of the patient's questions and concerns were addressed. Tetracycline Counseling: Patient counseled regarding possible photosensitivity and increased risk for sunburn.  Patient instructed to avoid sunlight, if possible.  When exposed to sunlight, patients should wear protective clothing, sunglasses, and sunscreen.  The patient was instructed to call the office immediately if the following severe adverse effects occur:  hearing changes, easy bruising/bleeding, severe headache, or vision changes.  The patient verbalized understanding of the proper use and possible adverse effects of tetracycline.  All of the patient's questions and concerns were addressed. Patient understands to avoid pregnancy while on therapy due to potential birth defects. Odomzo Counseling- I discussed with the patient the risks of Odomzo including but not limited to nausea, vomiting, diarrhea, constipation, weight loss, changes in the sense of taste, decreased appetite, muscle spasms, and hair loss.  The patient verbalized understanding of the proper use and possible adverse effects of Odomzo.  All of the patient's questions and concerns were addressed. Imiquimod Counseling:  I discussed with the patient the risks of imiquimod including but not limited to erythema, scaling, itching, weeping, crusting, and pain.  Patient understands that the inflammatory response to imiquimod is variable from person to person and was educated regarded proper titration schedule.  If flu-like symptoms develop, patient knows to discontinue the medication and contact us. Gabapentin Counseling: I discussed with the patient the risks of gabapentin including but not limited to dizziness, somnolence, fatigue and ataxia. Oral Minoxidil Pregnancy And Lactation Text: This medication should only be used when clearly needed if you are pregnant, attempting to become pregnant or breast feeding. Rinvoq Pregnancy And Lactation Text: Based on animal studies, Rinvoq may cause embryo-fetal harm when administered to pregnant women.  The medication should not be used in pregnancy.  Breastfeeding is not recommended during treatment and for 6 days after the last dose. Cellcept Counseling:  I discussed with the patient the risks of mycophenolate mofetil including but not limited to infection/immunosuppression, GI upset, hypokalemia, hypercholesterolemia, bone marrow suppression, lymphoproliferative disorders, malignancy, GI ulceration/bleed/perforation, colitis, interstitial lung disease, kidney failure, progressive multifocal leukoencephalopathy, and birth defects.  The patient understands that monitoring is required including a baseline creatinine and regular CBC testing. In addition, patient must alert us immediately if symptoms of infection or other concerning signs are noted. Cosentyx Counseling:  I discussed with the patient the risks of Cosentyx including but not limited to worsening of Crohn's disease, immunosuppression, allergic reactions and infections.  The patient understands that monitoring is required including a PPD at baseline and must alert us or the primary physician if symptoms of infection or other concerning signs are noted. Bexarotene Pregnancy And Lactation Text: This medication is Pregnancy Category X and should not be given to women who are pregnant or may become pregnant. This medication should not be used if you are breast feeding. Soolantra Counseling: I discussed with the patients the risks of topial Soolantra. This is a medicine which decreases the number of mites and inflammation in the skin. You experience burning, stinging, eye irritation or allergic reactions.  Please call our office if you develop any problems from using this medication. Minocycline Counseling: Patient advised regarding possible photosensitivity and discoloration of the teeth, skin, lips, tongue and gums.  Patient instructed to avoid sunlight, if possible.  When exposed to sunlight, patients should wear protective clothing, sunglasses, and sunscreen.  The patient was instructed to call the office immediately if the following severe adverse effects occur:  hearing changes, easy bruising/bleeding, severe headache, or vision changes.  The patient verbalized understanding of the proper use and possible adverse effects of minocycline.  All of the patient's questions and concerns were addressed. Thalidomide Counseling: I discussed with the patient the risks of thalidomide including but not limited to birth defects, anxiety, weakness, chest pain, dizziness, cough and severe allergy. Tremfya Counseling: I discussed with the patient the risks of guselkumab including but not limited to immunosuppression, serious infections, and drug reactions.  The patient understands that monitoring is required including a PPD at baseline and must alert us or the primary physician if symptoms of infection or other concerning signs are noted. Detail Level: Simple Azelaic Acid Pregnancy And Lactation Text: This medication is considered safe during pregnancy and breast feeding. Niacinamide Pregnancy And Lactation Text: These medications are considered safe during pregnancy. Drysol Counseling:  I discussed with the patient the risks of drysol/aluminum chloride including but not limited to skin rash, itching, irritation, burning. Simponi Counseling:  I discussed with the patient the risks of golimumab including but not limited to myelosuppression, immunosuppression, autoimmune hepatitis, demyelinating diseases, lymphoma, and serious infections.  The patient understands that monitoring is required including a PPD at baseline and must alert us or the primary physician if symptoms of infection or other concerning signs are noted. Opioid Counseling: I discussed with the patient the potential side effects of opioids including but not limited to addiction, altered mental status, and depression. I stressed avoiding alcohol, benzodiazepines, muscle relaxants and sleep aids unless specifically okayed by a physician. The patient verbalized understanding of the proper use and possible adverse effects of opioids. All of the patient's questions and concerns were addressed. They were instructed to flush the remaining pills down the toilet if they did not need them for pain. Cephalexin Pregnancy And Lactation Text: This medication is Pregnancy Category B and considered safe during pregnancy.  It is also excreted in breast milk but can be used safely for shorter doses. Finasteride Female Counseling: Finasteride Counseling:  I discussed with the patient the risks of use of finasteride including but not limited to decreased libido and sexual dysfunction. Explained the teratogenic nature of the medication and stressed the importance of not getting pregnant during treatment. All of the patient's questions and concerns were addressed. Otezla Counseling: The side effects of Otezla were discussed with the patient, including but not limited to worsening or new depression, weight loss, diarrhea, nausea, upper respiratory tract infection, and headache. Patient instructed to call the office should any adverse effect occur.  The patient verbalized understanding of the proper use and possible adverse effects of Otezla.  All the patient's questions and concerns were addressed. Winlevi Counseling:  I discussed with the patient the risks of topical clascoterone including but not limited to erythema, scaling, itching, and stinging. Patient voiced their understanding. Ivermectin Counseling:  Patient instructed to take medication on an empty stomach with a full glass of water.  Patient informed of potential adverse effects including but not limited to nausea, diarrhea, dizziness, itching, and swelling of the extremities or lymph nodes.  The patient verbalized understanding of the proper use and possible adverse effects of ivermectin.  All of the patient's questions and concerns were addressed. Klisyri Pregnancy And Lactation Text: It is unknown if this medication can harm a developing fetus or if it is excreted in breast milk. Protopic Counseling: Patient may experience a mild burning sensation during topical application. Protopic is not approved in children less than 2 years of age. There have been case reports of hematologic and skin malignancies in patients using topical calcineurin inhibitors although causality is questionable. Arava Counseling:  Patient counseled regarding adverse effects of Arava including but not limited to nausea, vomiting, abnormalities in liver function tests. Patients may develop mouth sores, rash, diarrhea, and abnormalities in blood counts. The patient understands that monitoring is required including LFTs and blood counts.  There is a rare possibility of scarring of the liver and lung problems that can occur when taking methotrexate. Persistent nausea, loss of appetite, pale stools, dark urine, cough, and shortness of breath should be reported immediately. Patient advised to discontinue Arava treatment and consult with a physician prior to attempting conception. The patient will have to undergo a treatment to eliminate Arava from the body prior to conception. Cimetidine Counseling:  I discussed with the patient the risks of Cimetidine including but not limited to gynecomastia, headache, diarrhea, nausea, drowsiness, arrhythmias, pancreatitis, skin rashes, psychosis, bone marrow suppression and kidney toxicity. Finasteride Pregnancy And Lactation Text: This medication is absolutely contraindicated during pregnancy. It is unknown if it is excreted in breast milk. Sotyktu Counseling:  I discussed the most common side effects of Sotyktu including: common cold, sore throat, sinus infections, cold sores, canker sores, folliculitis, and acne.  I also discussed more serious side effects of Sotyktu including but not limited to: serious allergic reactions; increased risk for infections such as TB; cancers such as lymphomas; rhabdomyolysis and elevated CPK; and elevated triglycerides and liver enzymes.  Clindamycin Counseling: I counseled the patient regarding use of clindamycin as an antibiotic for prophylactic and/or therapeutic purposes. Clindamycin is active against numerous classes of bacteria, including skin bacteria. Side effects may include nausea, diarrhea, gastrointestinal upset, rash, hives, yeast infections, and in rare cases, colitis. Benzoyl Peroxide Counseling: Patient counseled that medicine may cause skin irritation and bleach clothing.  In the event of skin irritation, the patient was advised to reduce the amount of the drug applied or use it less frequently.   The patient verbalized understanding of the proper use and possible adverse effects of benzoyl peroxide.  All of the patient's questions and concerns were addressed. Soolantra Pregnancy And Lactation Text: This medication is Pregnancy Category C. This medication is considered safe during breast feeding. Ilumya Counseling: I discussed with the patient the risks of tildrakizumab including but not limited to immunosuppression, malignancy, posterior leukoencephalopathy syndrome, and serious infections.  The patient understands that monitoring is required including a PPD at baseline and must alert us or the primary physician if symptoms of infection or other concerning signs are noted. Isotretinoin Counseling: Patient should get monthly blood tests, not donate blood, not drive at night if vision affected, not share medication, and not undergo elective surgery for 6 months after tx completed. Side effects reviewed, pt to contact office should one occur. Winlevi Pregnancy And Lactation Text: This medication is considered safe during pregnancy and breastfeeding. Topical Metronidazole Counseling: Metronidazole is a topical antibiotic medication. You may experience burning, stinging, redness, or allergic reactions.  Please call our office if you develop any problems from using this medication. Opioid Pregnancy And Lactation Text: These medications can lead to premature delivery and should be avoided during pregnancy. These medications are also present in breast milk in small amounts. Klisyri Counseling:  I discussed with the patient the risks of Klisyri including but not limited to erythema, scaling, itching, weeping, crusting, and pain. Otezla Pregnancy And Lactation Text: This medication is Pregnancy Category C and it isn't known if it is safe during pregnancy. It is unknown if it is excreted in breast milk. Fluconazole Counseling:  Patient counseled regarding adverse effects of fluconazole including but not limited to headache, diarrhea, nausea, upset stomach, liver function test abnormalities, taste disturbance, and stomach pain.  There is a rare possibility of liver failure that can occur when taking fluconazole.  The patient understands that monitoring of LFTs and kidney function test may be required, especially at baseline. The patient verbalized understanding of the proper use and possible adverse effects of fluconazole.  All of the patient's questions and concerns were addressed. Minoxidil Counseling: Minoxidil is a topical medication which can increase blood flow where it is applied. It is uncertain how this medication increases hair growth. Side effects are uncommon and include stinging and allergic reactions. Protopic Pregnancy And Lactation Text: This medication is Pregnancy Category C. It is unknown if this medication is excreted in breast milk when applied topically. Xolair Counseling:  Patient informed of potential adverse effects including but not limited to fever, muscle aches, rash and allergic reactions.  The patient verbalized understanding of the proper use and possible adverse effects of Xolair.  All of the patient's questions and concerns were addressed. Tranexamic Acid Counseling:  Patient advised of the small risk of bleeding problems with tranexamic acid. They were also instructed to call if they developed any nausea, vomiting or diarrhea. All of the patient's questions and concerns were addressed. Clindamycin Pregnancy And Lactation Text: This medication can be used in pregnancy if certain situations. Clindamycin is also present in breast milk. Benzoyl Peroxide Pregnancy And Lactation Text: This medication is Pregnancy Category C. It is unknown if benzoyl peroxide is excreted in breast milk. Cibinqo Counseling: I discussed with the patient the risks of Cibinqo therapy including but not limited to common cold, nausea, headache, cold sores, increased blood CPK levels, dizziness, UTIs, fatigue, acne, and vomitting. Live vaccines should be avoided.  This medication has been linked to serious infections; higher rate of mortality; malignancy and lymphoproliferative disorders; major adverse cardiovascular events; thrombosis; thrombocytopenia and lymphopenia; lipid elevations; and retinal detachment. Dupixent Counseling: I discussed with the patient the risks of dupilumab including but not limited to eye infection and irritation, cold sores, injection site reactions, worsening of asthma, allergic reactions and increased risk of parasitic infection.  Live vaccines should be avoided while taking dupilumab. Dupilumab will also interact with certain medications such as warfarin and cyclosporine. The patient understands that monitoring is required and they must alert us or the primary physician if symptoms of infection or other concerning signs are noted. Glycopyrrolate Counseling:  I discussed with the patient the risks of glycopyrrolate including but not limited to skin rash, drowsiness, dry mouth, difficulty urinating, and blurred vision. Birth Control Pills Counseling: Birth Control Pill Counseling: I discussed with the patient the potential side effects of OCPs including but not limited to increased risk of stroke, heart attack, thrombophlebitis, deep venous thrombosis, hepatic adenomas, breast changes, GI upset, headaches, and depression.  The patient verbalized understanding of the proper use and possible adverse effects of OCPs. All of the patient's questions and concerns were addressed. Quinolones Counseling:  I discussed with the patient the risks of fluoroquinolones including but not limited to GI upset, allergic reaction, drug rash, diarrhea, dizziness, photosensitivity, yeast infections, liver function test abnormalities, tendonitis/tendon rupture. Isotretinoin Pregnancy And Lactation Text: This medication is Pregnancy Category X and is considered extremely dangerous during pregnancy. It is unknown if it is excreted in breast milk. Cyclophosphamide Counseling:  I discussed with the patient the risks of cyclophosphamide including but not limited to hair loss, hormonal abnormalities, decreased fertility, abdominal pain, diarrhea, nausea and vomiting, bone marrow suppression and infection. The patient understands that monitoring is required while taking this medication. Topical Retinoid counseling:  Patient advised to apply a pea-sized amount only at bedtime and wait 30 minutes after washing their face before applying.  If too drying, patient may add a non-comedogenic moisturizer. The patient verbalized understanding of the proper use and possible adverse effects of retinoids.  All of the patient's questions and concerns were addressed. Elidel Counseling: Patient may experience a mild burning sensation during topical application. Elidel is not approved in children less than 2 years of age. There have been case reports of hematologic and skin malignancies in patients using topical calcineurin inhibitors although causality is questionable. Skyrizi Counseling: I discussed with the patient the risks of risankizumab-rzaa including but not limited to immunosuppression, and serious infections.  The patient understands that monitoring is required including a PPD at baseline and must alert us or the primary physician if symptoms of infection or other concerning signs are noted. Topical Metronidazole Pregnancy And Lactation Text: This medication is Pregnancy Category B and considered safe during pregnancy.  It is also considered safe to use while breastfeeding. Sotyktu Pregnancy And Lactation Text: There is insufficient data to evaluate whether or not Sotyktu is safe to use during pregnancy.   It is not known if Sotyktu passes into breast milk and whether or not it is safe to use when breastfeeding.   Qbrexza Counseling:  I discussed with the patient the risks of Qbrexza including but not limited to headache, mydriasis, blurred vision, dry eyes, nasal dryness, dry mouth, dry throat, dry skin, urinary hesitation, and constipation.  Local skin reactions including erythema, burning, stinging, and itching can also occur. Tranexamic Acid Pregnancy And Lactation Text: It is unknown if this medication is safe during pregnancy or breast feeding. Xolair Pregnancy And Lactation Text: This medication is Pregnancy Category B and is considered safe during pregnancy. This medication is excreted in breast milk. Fluconazole Pregnancy And Lactation Text: This medication is Pregnancy Category C and it isn't know if it is safe during pregnancy. It is also excreted in breast milk. VTAMA Counseling: I discussed with the patient that VTAMA is not for use in the eyes, mouth or mouth. They should call the office if they develop any signs of allergic reactions to VTAMA. The patient verbalized understanding of the proper use and possible adverse effects of VTAMA.  All of the patient's questions and concerns were addressed. Dutasteride Male Counseling: Dustasteride Counseling:  I discussed with the patient the risks of use of dutasteride including but not limited to decreased libido, decreased ejaculate volume, and gynecomastia. Women who can become pregnant should not handle medication.  All of the patient's questions and concerns were addressed. Oxybutynin Counseling:  I discussed with the patient the risks of oxybutynin including but not limited to skin rash, drowsiness, dry mouth, difficulty urinating, and blurred vision. Clofazimine Counseling:  I discussed with the patient the risks of clofazimine including but not limited to skin and eye pigmentation, liver damage, nausea/vomiting, gastrointestinal bleeding and allergy. Infliximab Counseling:  I discussed with the patient the risks of infliximab including but not limited to myelosuppression, immunosuppression, autoimmune hepatitis, demyelinating diseases, lymphoma, and serious infections.  The patient understands that monitoring is required including a PPD at baseline and must alert us or the primary physician if symptoms of infection or other concerning signs are noted. High Dose Vitamin A Counseling: Side effects reviewed, pt to contact office should one occur. Carac Counseling:  I discussed with the patient the risks of Carac including but not limited to erythema, scaling, itching, weeping, crusting, and pain. Doxycycline Counseling:  Patient counseled regarding possible photosensitivity and increased risk for sunburn.  Patient instructed to avoid sunlight, if possible.  When exposed to sunlight, patients should wear protective clothing, sunglasses, and sunscreen.  The patient was instructed to call the office immediately if the following severe adverse effects occur:  hearing changes, easy bruising/bleeding, severe headache, or vision changes.  The patient verbalized understanding of the proper use and possible adverse effects of doxycycline.  All of the patient's questions and concerns were addressed. Doxepin Counseling:  Patient advised that the medication is sedating and not to drive a car after taking this medication. Patient informed of potential adverse effects including but not limited to dry mouth, urinary retention, and blurry vision.  The patient verbalized understanding of the proper use and possible adverse effects of doxepin.  All of the patient's questions and concerns were addressed. Cibinqo Pregnancy And Lactation Text: It is unknown if this medication will adversely affect pregnancy or breast feeding.  You should not take this medication if you are currently pregnant or planning a pregnancy or while breastfeeding. Adbry Counseling: I discussed with the patient the risks of tralokinumab including but not limited to eye infection and irritation, cold sores, injection site reactions, worsening of asthma, allergic reactions and increased risk of parasitic infection.  Live vaccines should be avoided while taking tralokinumab. The patient understands that monitoring is required and they must alert us or the primary physician if symptoms of infection or other concerning signs are noted. Dupixent Pregnancy And Lactation Text: This medication likely crosses the placenta but the risk for the fetus is uncertain. This medication is excreted in breast milk. Cyclophosphamide Pregnancy And Lactation Text: This medication is Pregnancy Category D and it isn't considered safe during pregnancy. This medication is excreted in breast milk. Glycopyrrolate Pregnancy And Lactation Text: This medication is Pregnancy Category B and is considered safe during pregnancy. It is unknown if it is excreted breast milk. Mirvaso Counseling: Mirvaso is a topical medication which can decrease superficial blood flow where applied. Side effects are uncommon and include stinging, redness and allergic reactions. Xeljanz Counseling: I discussed with the patient the risks of Xeljanz therapy including increased risk of infection, liver issues, headache, diarrhea, or cold symptoms. Live vaccines should be avoided. They were instructed to call if they have any problems. Birth Control Pills Pregnancy And Lactation Text: This medication should be avoided if pregnant and for the first 30 days post-partum. Topical Steroids Counseling: I discussed with the patient that prolonged use of topical steroids can result in the increased appearance of superficial blood vessels (telangiectasias), lightening (hypopigmentation) and thinning of the skin (atrophy).  Patient understands to avoid using high potency steroids in skin folds, the groin or the face.  The patient verbalized understanding of the proper use and possible adverse effects of topical steroids.  All of the patient's questions and concerns were addressed. Itraconazole Counseling:  I discussed with the patient the risks of itraconazole including but not limited to liver damage, nausea/vomiting, neuropathy, and severe allergy.  The patient understands that this medication is best absorbed when taken with acidic beverages such as non-diet cola or ginger ale.  The patient understands that monitoring is required including baseline LFTs and repeat LFTs at intervals.  The patient understands that they are to contact us or the primary physician if concerning signs are noted. Dutasteride Female Counseling: Dutasteride Counseling:  I discussed with the patient the risks of use of dutasteride including but not limited to decreased libido and sexual dysfunction. Explained the teratogenic nature of the medication and stressed the importance of not getting pregnant during treatment. All of the patient's questions and concerns were addressed. Qbrexza Pregnancy And Lactation Text: There is no available data on Qbrexza use in pregnant women.  There is no available data on Qbrexza use in lactation. Nsaids Counseling: NSAID Counseling: I discussed with the patient that NSAIDs should be taken with food. Prolonged use of NSAIDs can result in the development of stomach ulcers.  Patient advised to stop taking NSAIDs if abdominal pain occurs.  The patient verbalized understanding of the proper use and possible adverse effects of NSAIDs.  All of the patient's questions and concerns were addressed. Hydroxychloroquine Counseling:  I discussed with the patient that a baseline ophthalmologic exam is needed at the start of therapy and every year thereafter while on therapy. A CBC may also be warranted for monitoring.  The side effects of this medication were discussed with the patient, including but not limited to agranulocytosis, aplastic anemia, seizures, rashes, retinopathy, and liver toxicity. Patient instructed to call the office should any adverse effect occur.  The patient verbalized understanding of the proper use and possible adverse effects of Plaquenil.  All the patient's questions and concerns were addressed. Adbry Pregnancy And Lactation Text: It is unknown if this medication will adversely affect pregnancy or breast feeding. Enbrel Counseling:  I discussed with the patient the risks of etanercept including but not limited to myelosuppression, immunosuppression, autoimmune hepatitis, demyelinating diseases, lymphoma, and infections.  The patient understands that monitoring is required including a PPD at baseline and must alert us or the primary physician if symptoms of infection or other concerning signs are noted. Litfulo Counseling: I discussed with the patient the risks of Litfulo therapy including but not limited to upper respiratory tract infections, shingles, cold sores, and nausea. Live vaccines should be avoided.  This medication has been linked to serious infections; higher rate of mortality; malignancy and lymphoproliferative disorders; major adverse cardiovascular events; thrombosis; gastrointestinal perforations; neutropenia; lymphopenia; anemia; liver enzyme elevations; and lipid elevations. Griseofulvin Counseling:  I discussed with the patient the risks of griseofulvin including but not limited to photosensitivity, cytopenia, liver damage, nausea/vomiting and severe allergy.  The patient understands that this medication is best absorbed when taken with a fatty meal (e.g., ice cream or french fries). Tazorac Counseling:  Patient advised that medication is irritating and drying.  Patient may need to apply sparingly and wash off after an hour before eventually leaving it on overnight.  The patient verbalized understanding of the proper use and possible adverse effects of tazorac.  All of the patient's questions and concerns were addressed. Rifampin Counseling: I discussed with the patient the risks of rifampin including but not limited to liver damage, kidney damage, red-orange body fluids, nausea/vomiting and severe allergy. High Dose Vitamin A Pregnancy And Lactation Text: High dose vitamin A therapy is contraindicated during pregnancy and breast feeding. Doxycycline Pregnancy And Lactation Text: This medication is Pregnancy Category D and not consider safe during pregnancy. It is also excreted in breast milk but is considered safe for shorter treatment courses. Doxepin Pregnancy And Lactation Text: This medication is Pregnancy Category C and it isn't known if it is safe during pregnancy. It is also excreted in breast milk and breast feeding isn't recommended. Valtrex Counseling: I discussed with the patient the risks of valacyclovir including but not limited to kidney damage, nausea, vomiting and severe allergy.  The patient understands that if the infection seems to be worsening or is not improving, they are to call. Cyclosporine Counseling:  I discussed with the patient the risks of cyclosporine including but not limited to hypertension, gingival hyperplasia,myelosuppression, immunosuppression, liver damage, kidney damage, neurotoxicity, lymphoma, and serious infections. The patient understands that monitoring is required including baseline blood pressure, CBC, CMP, lipid panel and uric acid, and then 1-2 times monthly CMP and blood pressure. Azithromycin Counseling:  I discussed with the patient the risks of azithromycin including but not limited to GI upset, allergic reaction, drug rash, diarrhea, and yeast infections. Stelara Counseling:  I discussed with the patient the risks of ustekinumab including but not limited to immunosuppression, malignancy, posterior leukoencephalopathy syndrome, and serious infections.  The patient understands that monitoring is required including a PPD at baseline and must alert us or the primary physician if symptoms of infection or other concerning signs are noted. Topical Steroids Applications Pregnancy And Lactation Text: Most topical steroids are considered safe to use during pregnancy and lactation.  Any topical steroid applied to the breast or nipple should be washed off before breastfeeding. Erivedge Counseling- I discussed with the patient the risks of Erivedge including but not limited to nausea, vomiting, diarrhea, constipation, weight loss, changes in the sense of taste, decreased appetite, muscle spasms, and hair loss.  The patient verbalized understanding of the proper use and possible adverse effects of Erivedge.  All of the patient's questions and concerns were addressed. Eucrisa Counseling: Patient may experience a mild burning sensation during topical application. Eucrisa is not approved in children less than 2 years of age. Spironolactone Counseling: Patient advised regarding risks of diarrhea, abdominal pain, hyperkalemia, birth defects (for female patients), liver toxicity and renal toxicity. The patient may need blood work to monitor liver and kidney function and potassium levels while on therapy. The patient verbalized understanding of the proper use and possible adverse effects of spironolactone.  All of the patient's questions and concerns were addressed. Xelzenyz Pregnancy And Lactation Text: This medication is Pregnancy Category D and is not considered safe during pregnancy.  The risk during breast feeding is also uncertain. Nsaids Pregnancy And Lactation Text: These medications are considered safe up to 30 weeks gestation. It is excreted in breast milk.

## 2025-03-18 ENCOUNTER — TRANSCRIPTION ENCOUNTER (OUTPATIENT)
Age: 75
End: 2025-03-18

## 2025-03-19 NOTE — H&P ADULT - TIME-BASED
Alert-The patient is alert, awake and responds to voice. The patient is oriented to time, place, and person. The triage nurse is able to obtain subjective information.
50

## 2025-04-02 ENCOUNTER — APPOINTMENT (OUTPATIENT)
Dept: PULMONOLOGY | Facility: CLINIC | Age: 75
End: 2025-04-02
Payer: MEDICARE

## 2025-04-02 VITALS
WEIGHT: 225 LBS | DIASTOLIC BLOOD PRESSURE: 72 MMHG | OXYGEN SATURATION: 99 % | HEIGHT: 74 IN | BODY MASS INDEX: 28.88 KG/M2 | HEART RATE: 77 BPM | SYSTOLIC BLOOD PRESSURE: 124 MMHG

## 2025-04-02 DIAGNOSIS — I26.99 OTHER PULMONARY EMBOLISM W/OUT ACUTE COR PULMONALE: ICD-10-CM

## 2025-04-02 DIAGNOSIS — R91.1 SOLITARY PULMONARY NODULE: ICD-10-CM

## 2025-04-02 PROCEDURE — G2211 COMPLEX E/M VISIT ADD ON: CPT

## 2025-04-02 PROCEDURE — 99214 OFFICE O/P EST MOD 30 MIN: CPT

## 2025-04-02 RX ORDER — APIXABAN 5 MG/1
5 TABLET, FILM COATED ORAL
Qty: 180 | Refills: 1 | Status: ACTIVE | COMMUNITY
Start: 2025-04-02 | End: 1900-01-01

## 2025-04-03 ENCOUNTER — TRANSCRIPTION ENCOUNTER (OUTPATIENT)
Age: 75
End: 2025-04-03

## 2025-04-24 ENCOUNTER — RESULT REVIEW (OUTPATIENT)
Age: 75
End: 2025-04-24

## 2025-04-24 ENCOUNTER — OUTPATIENT (OUTPATIENT)
Dept: OUTPATIENT SERVICES | Facility: HOSPITAL | Age: 75
LOS: 1 days | End: 2025-04-24
Payer: MEDICARE

## 2025-04-24 DIAGNOSIS — Z98.890 OTHER SPECIFIED POSTPROCEDURAL STATES: Chronic | ICD-10-CM

## 2025-04-24 DIAGNOSIS — R91.1 SOLITARY PULMONARY NODULE: ICD-10-CM

## 2025-04-24 PROCEDURE — 71250 CT THORAX DX C-: CPT | Mod: 26

## 2025-04-24 PROCEDURE — 71250 CT THORAX DX C-: CPT

## 2025-04-25 DIAGNOSIS — R91.1 SOLITARY PULMONARY NODULE: ICD-10-CM

## 2025-04-28 DIAGNOSIS — R91.1 SOLITARY PULMONARY NODULE: ICD-10-CM

## 2025-04-30 ENCOUNTER — OUTPATIENT (OUTPATIENT)
Dept: OUTPATIENT SERVICES | Facility: HOSPITAL | Age: 75
LOS: 1 days | End: 2025-04-30
Payer: MEDICARE

## 2025-04-30 ENCOUNTER — APPOINTMENT (OUTPATIENT)
Age: 75
End: 2025-04-30
Payer: MEDICARE

## 2025-04-30 VITALS
HEIGHT: 74 IN | DIASTOLIC BLOOD PRESSURE: 80 MMHG | WEIGHT: 231 LBS | HEART RATE: 66 BPM | OXYGEN SATURATION: 100 % | BODY MASS INDEX: 29.65 KG/M2 | RESPIRATION RATE: 14 BRPM | SYSTOLIC BLOOD PRESSURE: 122 MMHG

## 2025-04-30 DIAGNOSIS — I26.99 OTHER PULMONARY EMBOLISM W/OUT ACUTE COR PULMONALE: ICD-10-CM

## 2025-04-30 DIAGNOSIS — Z98.890 OTHER SPECIFIED POSTPROCEDURAL STATES: Chronic | ICD-10-CM

## 2025-04-30 DIAGNOSIS — I26.99 OTHER PULMONARY EMBOLISM WITHOUT ACUTE COR PULMONALE: ICD-10-CM

## 2025-04-30 PROCEDURE — G2211 COMPLEX E/M VISIT ADD ON: CPT

## 2025-04-30 PROCEDURE — 99214 OFFICE O/P EST MOD 30 MIN: CPT

## 2025-04-30 PROCEDURE — 99204 OFFICE O/P NEW MOD 45 MIN: CPT

## 2025-05-01 DIAGNOSIS — I26.99 OTHER PULMONARY EMBOLISM WITHOUT ACUTE COR PULMONALE: ICD-10-CM

## 2025-07-14 ENCOUNTER — OUTPATIENT (OUTPATIENT)
Dept: OUTPATIENT SERVICES | Facility: HOSPITAL | Age: 75
LOS: 1 days | End: 2025-07-14
Payer: MEDICARE

## 2025-07-14 ENCOUNTER — RESULT REVIEW (OUTPATIENT)
Age: 75
End: 2025-07-14

## 2025-07-14 DIAGNOSIS — Z00.8 ENCOUNTER FOR OTHER GENERAL EXAMINATION: ICD-10-CM

## 2025-07-14 DIAGNOSIS — Z98.890 OTHER SPECIFIED POSTPROCEDURAL STATES: Chronic | ICD-10-CM

## 2025-07-14 DIAGNOSIS — R91.1 SOLITARY PULMONARY NODULE: ICD-10-CM

## 2025-07-14 PROCEDURE — 71250 CT THORAX DX C-: CPT

## 2025-07-14 PROCEDURE — 71250 CT THORAX DX C-: CPT | Mod: 26

## 2025-07-15 DIAGNOSIS — R91.1 SOLITARY PULMONARY NODULE: ICD-10-CM

## 2025-07-16 ENCOUNTER — NON-APPOINTMENT (OUTPATIENT)
Age: 75
End: 2025-07-16